# Patient Record
Sex: FEMALE | Race: WHITE | NOT HISPANIC OR LATINO | Employment: OTHER | RURAL
[De-identification: names, ages, dates, MRNs, and addresses within clinical notes are randomized per-mention and may not be internally consistent; named-entity substitution may affect disease eponyms.]

---

## 2022-05-16 DIAGNOSIS — M25.562 LEFT KNEE PAIN, UNSPECIFIED CHRONICITY: Primary | ICD-10-CM

## 2022-05-18 ENCOUNTER — OFFICE VISIT (OUTPATIENT)
Dept: ORTHOPEDICS | Facility: CLINIC | Age: 72
End: 2022-05-18
Payer: COMMERCIAL

## 2022-05-18 ENCOUNTER — HOSPITAL ENCOUNTER (OUTPATIENT)
Dept: RADIOLOGY | Facility: HOSPITAL | Age: 72
Discharge: HOME | End: 2022-05-18
Attending: ORTHOPAEDIC SURGERY
Payer: COMMERCIAL

## 2022-05-18 VITALS — WEIGHT: 220 LBS | BODY MASS INDEX: 40.48 KG/M2 | HEIGHT: 62 IN

## 2022-05-18 DIAGNOSIS — M25.562 LEFT KNEE PAIN, UNSPECIFIED CHRONICITY: ICD-10-CM

## 2022-05-18 DIAGNOSIS — M17.11 OSTEOARTHRITIS OF RIGHT KNEE, UNSPECIFIED OSTEOARTHRITIS TYPE: ICD-10-CM

## 2022-05-18 DIAGNOSIS — M25.561 CHRONIC PAIN OF RIGHT KNEE: Primary | ICD-10-CM

## 2022-05-18 DIAGNOSIS — G89.29 CHRONIC PAIN OF RIGHT KNEE: Primary | ICD-10-CM

## 2022-05-18 PROCEDURE — 1159F PR MEDICATION LIST DOCUMENTED IN MEDICAL RECORD: ICD-10-PCS | Mod: CPTII,,, | Performed by: NURSE PRACTITIONER

## 2022-05-18 PROCEDURE — 73564 X-RAY EXAM KNEE 4 OR MORE: CPT | Mod: 26,RT,,

## 2022-05-18 PROCEDURE — 3008F PR BODY MASS INDEX (BMI) DOCUMENTED: ICD-10-PCS | Mod: CPTII,,, | Performed by: NURSE PRACTITIONER

## 2022-05-18 PROCEDURE — 99213 PR OFFICE/OUTPT VISIT, EST, LEVL III, 20-29 MIN: ICD-10-PCS | Mod: 25,,, | Performed by: NURSE PRACTITIONER

## 2022-05-18 PROCEDURE — 99213 OFFICE O/P EST LOW 20 MIN: CPT | Mod: 25,,, | Performed by: NURSE PRACTITIONER

## 2022-05-18 PROCEDURE — 3008F BODY MASS INDEX DOCD: CPT | Mod: CPTII,,, | Performed by: NURSE PRACTITIONER

## 2022-05-18 PROCEDURE — 20610 DRAIN/INJ JOINT/BURSA W/O US: CPT | Mod: RT,,, | Performed by: NURSE PRACTITIONER

## 2022-05-18 PROCEDURE — 73564 XR KNEE COMP 4 OR MORE VIEWS RIGHT: ICD-10-PCS | Mod: 26,RT,,

## 2022-05-18 PROCEDURE — 73564 X-RAY EXAM KNEE 4 OR MORE: CPT | Mod: TC,RT

## 2022-05-18 PROCEDURE — 20610 LARGE JOINT ASPIRATION/INJECTION: R SUPRA PATELLAR BURSA: ICD-10-PCS | Mod: RT,,, | Performed by: NURSE PRACTITIONER

## 2022-05-18 PROCEDURE — 1159F MED LIST DOCD IN RCRD: CPT | Mod: CPTII,,, | Performed by: NURSE PRACTITIONER

## 2022-05-18 RX ORDER — TRIAMCINOLONE ACETONIDE 40 MG/ML
40 INJECTION, SUSPENSION INTRA-ARTICULAR; INTRAMUSCULAR
Status: DISCONTINUED | OUTPATIENT
Start: 2022-05-18 | End: 2022-05-18 | Stop reason: HOSPADM

## 2022-05-18 RX ORDER — GLIMEPIRIDE 2 MG/1
4 TABLET ORAL 2 TIMES DAILY
COMMUNITY

## 2022-05-18 RX ORDER — METFORMIN HYDROCHLORIDE 1000 MG/1
500 TABLET ORAL 2 TIMES DAILY
COMMUNITY

## 2022-05-18 RX ORDER — ASPIRIN 81 MG/1
81 TABLET ORAL DAILY
COMMUNITY

## 2022-05-18 RX ORDER — DULOXETIN HYDROCHLORIDE 60 MG/1
60 CAPSULE, DELAYED RELEASE ORAL DAILY
COMMUNITY

## 2022-05-18 RX ORDER — OXYBUTYNIN CHLORIDE 5 MG/1
5 TABLET ORAL 2 TIMES DAILY
COMMUNITY

## 2022-05-18 RX ADMIN — TRIAMCINOLONE ACETONIDE 40 MG: 40 INJECTION, SUSPENSION INTRA-ARTICULAR; INTRAMUSCULAR at 08:05

## 2022-05-18 NOTE — PROGRESS NOTES
ASSESSMENT:      ICD-10-CM ICD-9-CM   1. Chronic pain of right knee  M25.561 719.46    G89.29 338.29   2. Osteoarthritis of right knee, unspecified osteoarthritis type  M17.11 715.96       PLAN:     Findings and treatment options were discussed with the patient regarding the diagnosis.   All questions were answered regarding Jessica Cruz 's painful knee.      Natural history and expected course discussed. Questions answered. Educational materials distributed. Reduction in offending activity. OTC analgesics as needed. Arthrocentesis. See procedure note.   Right knee injection today  RTC 6-8 weeks    There are no Patient Instructions on file for this visit.    IMAGING:   X-Ray Knee Complete 4 Or More Views Right    Result Date: 5/18/2022  EXAMINATION: XR KNEE COMP 4 OR MORE VIEWS RIGHT CLINICAL HISTORY: Pain in left knee FINDINGS: There are moderate osteophytes in all 3 compartments There is severe medial compartment joint space loss No acute fracture or aggressive periosteal reaction seen Vascular calcifications present.     1. Osteoarthritis 2. Peripheral vascular disease Electronically signed by: Faye Dewitt Date:    05/18/2022 Time:    09:01         CC: Knee pain    72 y.o. Female who presents as a new patient to me for evaluation of  right knee pain.  She has a history of a left knee replacement done in [place] 15 years ago. She has done well with that knee. She has been bothered by her right knee for many years but has never had any treatment for her right knee.  Occupation: retired  Pain has been present for several years.  Injury description no injury.   Mechanical symptoms, such as clicking, locking, and popping are present.    Swelling and effusions  are not present.   The patient does  describe symptoms of knee instability, such as the knee buckling and the knee giving way.   Symptoms are worsened with activity.  Better with rest. Treatment thus far has included rest, activity modifications, and  oral medications.    she  has not had formal physical therapy.  she has not had prior injections injections into the knee.   she has not had previous advanced imaging such as MRI.     Here today to discuss diagnosis and treatment options.           REVIEW OF SYSTEMS:   Constitution: Negative. Negative for chills, fever and night sweats.    Hematologic/Lymphatic: Negative for bleeding problem. Does not bruise/bleed easily.   Skin: Negative for dry skin, itching and rash.   Musculoskeletal: Negative for falls. Positive for knee pain and muscle weakness.     All other review of symptoms were reviewed and found to be noncontributory.     PAST MEDICAL HISTORY:   Past Medical History:   Diagnosis Date    Diabetes mellitus, type 2        PAST SURGICAL HISTORY:   Past Surgical History:   Procedure Laterality Date    CEREBRAL ANEURYSM REPAIR       SECTION      deaf Right     since childhood    JOINT REPLACEMENT      LAPAROSCOPIC CHOLECYSTECTOMY      SHOULDER ARTHROSCOPY         FAMILY HISTORY:   Family History   Problem Relation Age of Onset    Cancer Mother     Hypoglycemic Mother     Heart disease Mother     Cancer Father        SOCIAL HISTORY:   Social History     Socioeconomic History    Marital status: Unknown   Tobacco Use    Smoking status: Former Smoker    Smokeless tobacco: Never Used    Tobacco comment: chews nicotine gum   Substance and Sexual Activity    Alcohol use: Yes     Alcohol/week: 14.0 standard drinks     Types: 14 Glasses of wine per week       MEDICATIONS:     Current Outpatient Medications:     aspirin (ECOTRIN) 81 MG EC tablet, Take 81 mg by mouth once daily., Disp: , Rfl:     DULoxetine (CYMBALTA) 60 MG capsule, Take 60 mg by mouth once daily., Disp: , Rfl:     glimepiride (AMARYL) 2 MG tablet, Take 4 mg by mouth 2 (two) times a day., Disp: , Rfl:     metFORMIN (GLUCOPHAGE) 1000 MG tablet, Take 500 mg by mouth 2 (two) times a day., Disp: , Rfl:     oxybutynin (DITROPAN) 5  "MG Tab, Take 5 mg by mouth 3 (three) times daily., Disp: , Rfl:     ALLERGIES:   Review of patient's allergies indicates:   Allergen Reactions    Latex, natural rubber Itching        PHYSICAL EXAMINATION:  Ht 5' 2" (1.575 m)   Wt 99.8 kg (220 lb)   BMI 40.24 kg/m²   General    Constitutional: She is oriented to person, place, and time. She appears well-nourished.   HENT:   Head: Normocephalic and atraumatic.   Eyes: Pupils are equal, round, and reactive to light.   Neck: Neck supple.   Cardiovascular: Normal rate and regular rhythm.    Pulmonary/Chest: Effort normal. No respiratory distress.   Abdominal: There is no abdominal tenderness. There is no guarding.   Neurological: She is alert and oriented to person, place, and time. She has normal reflexes.   Psychiatric: She has a normal mood and affect. Her behavior is normal. Judgment and thought content normal.           Right Knee Exam     Inspection   Swelling: present  Effusion: present    Tenderness   The patient is tender to palpation of the medial joint line and lateral joint line.    Crepitus   The patient has crepitus of the patella.    Range of Motion   Extension: normal   Flexion: abnormal     Tests   Meniscus   Rufina:  Medial - positive   Ligament Examination Lachman: normal (-1 to 2mm) PCL-Posterior Drawer: normal (0 to 2mm)     Patella   Patellar Tracking: normal  Patellar Grind: positive    Other   Sensation: normal    Muscle Strength   Right Lower Extremity   Quadriceps:  5/5   Hamstrin/5     Reflexes     Right Side   Achilles:  2+  Quadriceps:  2+    Vascular Exam     Right Pulses  Dorsalis Pedis:      2+  Posterior Tibial:      2+              No orders of the defined types were placed in this encounter.      Large Joint Aspiration/Injection: R supra patellar bursa    Date/Time: 2022 8:30 AM  Performed by: CAREN Londono  Authorized by: CAREN Londono     Consent Done?:  Yes (Verbal)  Indications:  Pain  Site marked: the " procedure site was marked    Local anesthetic:  Bupivacaine 0.25% without epinephrine    Details:  Needle Size:  22 G  Location:  Knee  Site:  R supra patellar bursa  Medications:  40 mg triamcinolone acetonide 40 mg/mL  Patient tolerance:  Patient tolerated the procedure well with no immediate complications

## 2022-08-17 ENCOUNTER — OFFICE VISIT (OUTPATIENT)
Dept: ORTHOPEDICS | Facility: CLINIC | Age: 72
End: 2022-08-17
Payer: COMMERCIAL

## 2022-08-17 DIAGNOSIS — M17.11 OSTEOARTHRITIS OF RIGHT KNEE, UNSPECIFIED OSTEOARTHRITIS TYPE: Primary | ICD-10-CM

## 2022-08-17 PROCEDURE — 20610 DRAIN/INJ JOINT/BURSA W/O US: CPT | Mod: RT,,, | Performed by: NURSE PRACTITIONER

## 2022-08-17 PROCEDURE — 20610 LARGE JOINT ASPIRATION/INJECTION: R SUPRA PATELLAR BURSA: ICD-10-PCS | Mod: RT,,, | Performed by: NURSE PRACTITIONER

## 2022-08-17 RX ORDER — TRIAMCINOLONE ACETONIDE 40 MG/ML
40 INJECTION, SUSPENSION INTRA-ARTICULAR; INTRAMUSCULAR
Status: DISCONTINUED | OUTPATIENT
Start: 2022-08-17 | End: 2022-08-17 | Stop reason: HOSPADM

## 2022-08-17 RX ADMIN — TRIAMCINOLONE ACETONIDE 40 MG: 40 INJECTION, SUSPENSION INTRA-ARTICULAR; INTRAMUSCULAR at 10:08

## 2022-08-17 NOTE — PROGRESS NOTES
CC:  Knee pain    72 y.o. Female returns to clinic for a follow up visit regarding knee pain.       Pt is here for recheck rt knee, states her injection from May gave her good relief, she would like to have another injection on today. She is trying to hold off on total knee surgery for as long as possible.        Past Medical History:   Diagnosis Date    Diabetes mellitus, type 2      Past Surgical History:   Procedure Laterality Date    CEREBRAL ANEURYSM REPAIR       SECTION      deaf Right     since childhood    JOINT REPLACEMENT      LAPAROSCOPIC CHOLECYSTECTOMY      SHOULDER ARTHROSCOPY           REVIEW OF SYSTEMS:   Constitution: Negative. Negative for chills, fever and night sweats.    Hematologic/Lymphatic: Negative for bleeding problem. Does not bruise/bleed easily.   Skin: Negative for dry skin, itching and rash.   Musculoskeletal: Negative for falls. Positive for knee pain and muscle weakness.   All other review of symptoms were reviewed and found to be noncontributory.     PHYSICAL EXAMINATION:  There were no vitals taken for this visit.  General    Constitutional: She is oriented to person, place, and time. She appears well-nourished.   HENT:   Head: Normocephalic and atraumatic.   Eyes: Pupils are equal, round, and reactive to light.   Neck: Neck supple.   Cardiovascular: Normal rate and regular rhythm.    Pulmonary/Chest: Effort normal. No respiratory distress.   Abdominal: There is no abdominal tenderness. There is no guarding.   Neurological: She is alert and oriented to person, place, and time. She has normal reflexes.   Psychiatric: She has a normal mood and affect. Her behavior is normal. Judgment and thought content normal.           Right Knee Exam     Inspection   Swelling: present  Effusion: present    Tenderness   The patient is tender to palpation of the medial joint line and lateral joint line.    Crepitus   The patient has crepitus of the patella.    Range of Motion    Extension: normal   Flexion: abnormal     Tests   Meniscus   Rufina:  Medial - positive   Ligament Examination Lachman: normal (-1 to 2mm) PCL-Posterior Drawer: normal (0 to 2mm)     Patella   Patellar Tracking: normal  Patellar Grind: positive    Other   Sensation: normal    Muscle Strength   Right Lower Extremity   Quadriceps:  5/5   Hamstrin/5     Reflexes     Right Side   Achilles:  2+  Quadriceps:  2+    Vascular Exam     Right Pulses  Dorsalis Pedis:      2+  Posterior Tibial:      2+              IMAGING:  No results found.     ASSESSMENT:      ICD-10-CM ICD-9-CM   1. Osteoarthritis of right knee, unspecified osteoarthritis type  M17.11 715.96       PLAN:     -Findings and treatment options were discussed with the patient  -All questions answered  Natural history and expected course discussed. Questions answered.  Educational materials distributed.  Reduction in offending activity.  OTC analgesics as needed.  Arthrocentesis. See procedure note.  Right knee injection today  RTC 3-4 months    There are no Patient Instructions on file for this visit.      Orders Placed This Encounter   Procedures    Large Joint Aspiration/Injection: R supra patellar bursa         Large Joint Aspiration/Injection: R supra patellar bursa    Date/Time: 2022 10:30 AM  Performed by: CAREN Londono  Authorized by: CAREN Londono     Consent Done?:  Yes (Verbal)  Indications:  Pain  Site marked: the procedure site was marked    Local anesthetic:  Bupivacaine 0.25% without epinephrine    Details:  Needle Size:  22 G  Location:  Knee  Site:  R supra patellar bursa  Medications:  40 mg triamcinolone acetonide 40 mg/mL  Patient tolerance:  Patient tolerated the procedure well with no immediate complications

## 2022-11-16 ENCOUNTER — OFFICE VISIT (OUTPATIENT)
Dept: ORTHOPEDICS | Facility: CLINIC | Age: 72
End: 2022-11-16
Payer: COMMERCIAL

## 2022-11-16 DIAGNOSIS — M17.11 OSTEOARTHRITIS OF RIGHT KNEE, UNSPECIFIED OSTEOARTHRITIS TYPE: Primary | ICD-10-CM

## 2022-11-16 PROCEDURE — 1159F PR MEDICATION LIST DOCUMENTED IN MEDICAL RECORD: ICD-10-PCS | Mod: CPTII,,, | Performed by: NURSE PRACTITIONER

## 2022-11-16 PROCEDURE — 1159F MED LIST DOCD IN RCRD: CPT | Mod: CPTII,,, | Performed by: NURSE PRACTITIONER

## 2022-11-16 PROCEDURE — 20610 LARGE JOINT ASPIRATION/INJECTION: R SUPRA PATELLAR BURSA: ICD-10-PCS | Mod: RT,,, | Performed by: NURSE PRACTITIONER

## 2022-11-16 PROCEDURE — 20610 DRAIN/INJ JOINT/BURSA W/O US: CPT | Mod: RT,,, | Performed by: NURSE PRACTITIONER

## 2022-11-16 RX ORDER — TRIAMCINOLONE ACETONIDE 40 MG/ML
40 INJECTION, SUSPENSION INTRA-ARTICULAR; INTRAMUSCULAR
Status: DISCONTINUED | OUTPATIENT
Start: 2022-11-16 | End: 2022-11-16 | Stop reason: HOSPADM

## 2022-11-16 RX ADMIN — TRIAMCINOLONE ACETONIDE 40 MG: 40 INJECTION, SUSPENSION INTRA-ARTICULAR; INTRAMUSCULAR at 11:11

## 2022-11-16 NOTE — PROGRESS NOTES
CC:  Knee pain    72 y.o. Female returns to clinic for a follow up visit regarding knee pain.       Patient is having knee pain again. She reports in the first shots lasted 3 months but the last one lasted only about 2 months.   She would like to get another injection today. She would also like to discuss total knee arthroplasty on next visit.        Past Medical History:   Diagnosis Date    Diabetes mellitus, type 2      Past Surgical History:   Procedure Laterality Date    CEREBRAL ANEURYSM REPAIR       SECTION      deaf Right     since childhood    JOINT REPLACEMENT      LAPAROSCOPIC CHOLECYSTECTOMY      SHOULDER ARTHROSCOPY           REVIEW OF SYSTEMS:   Constitution: Negative. Negative for chills, fever and night sweats.    Hematologic/Lymphatic: Negative for bleeding problem. Does not bruise/bleed easily.   Skin: Negative for dry skin, itching and rash.   Musculoskeletal: Negative for falls. Positive for knee pain and muscle weakness.   All other review of symptoms were reviewed and found to be noncontributory.     PHYSICAL EXAMINATION:  There were no vitals taken for this visit.  General    Constitutional: She is oriented to person, place, and time. She appears well-nourished.   HENT:   Head: Normocephalic and atraumatic.   Eyes: Pupils are equal, round, and reactive to light.   Neck: Neck supple.   Cardiovascular:  Normal rate and regular rhythm.            Pulmonary/Chest: Effort normal. No respiratory distress.   Abdominal: There is no abdominal tenderness. There is no guarding.   Neurological: She is alert and oriented to person, place, and time. She has normal reflexes.   Psychiatric: She has a normal mood and affect. Her behavior is normal. Judgment and thought content normal.           Right Knee Exam     Inspection   Swelling: present  Effusion: present    Tenderness   The patient is tender to palpation of the medial joint line and lateral joint line.    Crepitus   The patient has  crepitus of the patella.    Range of Motion   Extension:  normal   Flexion:  abnormal     Tests   Meniscus   Rufina:  Medial - positive   Ligament Examination   Lachman: normal (-1 to 2mm)   PCL-Posterior Drawer: normal (0 to 2mm)     Patella   Patellar Tracking: normal  Patellar Grind: positive    Other   Sensation: normal    Muscle Strength   Right Lower Extremity   Quadriceps:  5/5   Hamstrin/5     Reflexes     Right Side   Achilles:  2+  Quadriceps:  2+    Vascular Exam     Right Pulses  Dorsalis Pedis:      2+  Posterior Tibial:      2+        IMAGING:  No results found.     ASSESSMENT:      ICD-10-CM ICD-9-CM   1. Osteoarthritis of right knee, unspecified osteoarthritis type  M17.11 715.96       PLAN:     -Findings and treatment options were discussed with the patient  -All questions answered  Natural history and expected course discussed. Questions answered.  Educational materials distributed.  Reduction in offending activity.  OTC analgesics as needed.  Arthrocentesis. See procedure note.  Right knee steroid injection today.  Return to clinic in 3 months to discuss surgery options with Dr. Godoy.    There are no Patient Instructions on file for this visit.      No orders of the defined types were placed in this encounter.        Large Joint Aspiration/Injection: R supra patellar bursa    Date/Time: 2022 11:00 AM  Performed by: CAREN Londono  Authorized by: CAREN Londono     Consent Done?:  Yes (Verbal)  Indications:  Pain  Site marked: the procedure site was marked    Local anesthetic:  Bupivacaine 0.25% without epinephrine    Details:  Needle Size:  22 G  Location:  Knee  Site:  R supra patellar bursa  Medications:  40 mg triamcinolone acetonide 40 mg/mL  Patient tolerance:  Patient tolerated the procedure well with no immediate complications

## 2023-07-28 DIAGNOSIS — M17.11 OSTEOARTHRITIS OF RIGHT KNEE, UNSPECIFIED OSTEOARTHRITIS TYPE: Primary | ICD-10-CM

## 2023-08-03 ENCOUNTER — HOSPITAL ENCOUNTER (OUTPATIENT)
Dept: RADIOLOGY | Facility: HOSPITAL | Age: 73
Discharge: HOME | End: 2023-08-03
Attending: ORTHOPAEDIC SURGERY
Payer: MEDICARE

## 2023-08-03 ENCOUNTER — OFFICE VISIT (OUTPATIENT)
Dept: ORTHOPEDICS | Facility: CLINIC | Age: 73
End: 2023-08-03
Payer: MEDICARE

## 2023-08-03 DIAGNOSIS — M17.11 OSTEOARTHRITIS OF RIGHT KNEE, UNSPECIFIED OSTEOARTHRITIS TYPE: Primary | ICD-10-CM

## 2023-08-03 DIAGNOSIS — Z01.818 PRE-PROCEDURAL EXAMINATION: ICD-10-CM

## 2023-08-03 DIAGNOSIS — R79.1 ABNORMAL PARTIAL THROMBOPLASTIN TIME (PTT): ICD-10-CM

## 2023-08-03 DIAGNOSIS — M17.11 OSTEOARTHRITIS OF RIGHT KNEE, UNSPECIFIED OSTEOARTHRITIS TYPE: ICD-10-CM

## 2023-08-03 PROCEDURE — 99214 OFFICE O/P EST MOD 30 MIN: CPT | Mod: PBBFAC | Performed by: ORTHOPAEDIC SURGERY

## 2023-08-03 PROCEDURE — 1159F PR MEDICATION LIST DOCUMENTED IN MEDICAL RECORD: ICD-10-PCS | Mod: CPTII,,, | Performed by: ORTHOPAEDIC SURGERY

## 2023-08-03 PROCEDURE — 1159F MED LIST DOCD IN RCRD: CPT | Mod: CPTII,,, | Performed by: ORTHOPAEDIC SURGERY

## 2023-08-03 PROCEDURE — 73564 X-RAY EXAM KNEE 4 OR MORE: CPT | Mod: 26,RT,, | Performed by: ORTHOPAEDIC SURGERY

## 2023-08-03 PROCEDURE — 73564 XR KNEE COMP 4 OR MORE VIEWS RIGHT: ICD-10-PCS | Mod: 26,RT,, | Performed by: ORTHOPAEDIC SURGERY

## 2023-08-03 PROCEDURE — 73564 X-RAY EXAM KNEE 4 OR MORE: CPT | Mod: TC,RT

## 2023-08-03 PROCEDURE — 99214 OFFICE O/P EST MOD 30 MIN: CPT | Mod: S$PBB,,, | Performed by: ORTHOPAEDIC SURGERY

## 2023-08-03 PROCEDURE — 99214 PR OFFICE/OUTPT VISIT, EST, LEVL IV, 30-39 MIN: ICD-10-PCS | Mod: S$PBB,,, | Performed by: ORTHOPAEDIC SURGERY

## 2023-08-03 RX ORDER — MUPIROCIN 20 MG/G
OINTMENT TOPICAL
Status: CANCELLED | OUTPATIENT
Start: 2023-08-03

## 2023-08-03 RX ORDER — SODIUM CHLORIDE 9 MG/ML
INJECTION, SOLUTION INTRAVENOUS CONTINUOUS
Status: CANCELLED | OUTPATIENT
Start: 2023-08-03

## 2023-08-03 NOTE — H&P (VIEW-ONLY)
CC:  Knee pain    73 y.o. Female returns to clinic for a follow up visit regarding right knee pain.       Patient has had injections in the past with the last being in 2022.   She since has had blood clots in that leg so she had not received any additional treatment for her knee pain.  This has been treated with appropriate anticoagulation for over 6 months  She now has that issue resolved and has been off blood thinners since 2023.     She is here to discuss total knee arthroplasty at this time.          Past Medical History:   Diagnosis Date    Diabetes mellitus, type 2      Past Surgical History:   Procedure Laterality Date    CEREBRAL ANEURYSM REPAIR       SECTION      deaf Right     since childhood    JOINT REPLACEMENT      LAPAROSCOPIC CHOLECYSTECTOMY      SHOULDER ARTHROSCOPY           PHYSICAL EXAMINATION:  There were no vitals taken for this visit.  General    Nursing note and vitals reviewed.  Constitutional: She is oriented to person, place, and time. She appears well-developed and well-nourished.   HENT:   Head: Normocephalic and atraumatic.   Nose: Nose normal.   Eyes: Pupils are equal, round, and reactive to light.   Neck: Neck supple.   Cardiovascular:  Normal rate, regular rhythm and intact distal pulses.            Pulmonary/Chest: Effort normal. No respiratory distress. She exhibits no tenderness.   Abdominal: Soft. She exhibits no distension. There is no abdominal tenderness.   Neurological: She is alert and oriented to person, place, and time. She has normal reflexes.   Psychiatric: She has a normal mood and affect. Her behavior is normal. Judgment and thought content normal.     General Musculoskeletal Exam   Gait: antalgic       Right Knee Exam     Inspection   Swelling: present  Effusion: present  Deformity: present    Tenderness   The patient is tender to palpation of the medial joint line.    Crepitus   The patient has crepitus of the patella and medial joint  line.    Range of Motion   Extension:  abnormal   Flexion:  abnormal     Tests   Meniscus   Rufina:  Medial - positive   Ligament Examination   Lachman: normal (-1 to 2mm)   PCL-Posterior Drawer: normal (0 to 2mm)     MCL - Valgus: normal (0 to 2mm)  LCL - Varus: normal  Pivot Shift: normal (Equal)  Reverse Pivot Shift: normal (Equal)  Dial Test at 30 degrees: normal (< 5 degrees)  Dial Test at 90 degrees: normal (< 5 degrees)  Posterior Sag Test: negative  Posterolateral Corner: unstable (>15 degrees difference)  Patella   Patellar Tracking: normal  Q-Angle at 90 degrees: normal  Patellar Grind: positive    Other   Sensation: normal    Muscle Strength   Right Lower Extremity   Quadriceps:  5/5   Hamstrin/5     Reflexes     Right Side   Quadriceps:  2+    Vascular Exam     Right Pulses  Dorsalis Pedis:      2+  Posterior Tibial:      2+            IMAGING:  X-Ray Knee Complete 4 Or More Views Right    Result Date: 8/3/2023  See Procedure Notes for results. IMPRESSION: Please see Ortho procedure notes for report.  This procedure was auto-finalized by: Virtual Radiologist     Four views right knee were obtained today demonstrate the presence of severe degenerative changes primarily affecting the medial compartment of the right knee as well as severe changes affecting the patellofemoral compartment.  Bone-on-bone degenerative changes were seen    ASSESSMENT:      ICD-10-CM ICD-9-CM   1. Osteoarthritis of right knee, unspecified osteoarthritis type  M17.11 715.96       PLAN:     -Findings and treatment options were discussed with the patient  -All questions answered  Natural history and expected course discussed. Questions answered.  Educational materials distributed.  Rest, ice, compression, and elevation (RICE) therapy.  The conservative options including NSAIDs, activity modification, physical therapy, corticosteroid injection, and viscosupplimentation were discussed. She is interested in surgical intervention  at this time.    The surgical process of knee replacement was discussed in detail with the patient including a detailed discussion of the procedure itself (including visual model, x-ray review, and literature review). The typical perioperative and post-operative course was discussed and perioperative risks were discussed to the patient's satisfaction.  Risks and complications discussed included but were not limited to the risks of anesthetic complications, infection, bleeding, wound healing complications, aseptic loosening, instability, limb length inequality, neurologic dysfunction including numbness,  DVT, pulmonary embolism, perioperative medical risks (cardiac, pulmonary, renal, neurologic), and death and the patient elects to proceed. We will initiate pre-operative medical evaluation and clearance and set a provisional date for surgical intervention according to the patient's schedule.   I have discussed anticoagulation with aspirin and coumadin and in low risk patients I have recommended aspirin twice a day.  25 minutes was spent in direct consultation with the patient counselling her on the items listed above.    Plan for rt tka    There are no Patient Instructions on file for this visit.      No orders of the defined types were placed in this encounter.        Procedures

## 2023-08-07 ENCOUNTER — TELEPHONE (OUTPATIENT)
Dept: ORTHOPEDICS | Facility: CLINIC | Age: 73
End: 2023-08-07
Payer: MEDICARE

## 2023-08-07 RX ORDER — SULFAMETHOXAZOLE AND TRIMETHOPRIM 800; 160 MG/1; MG/1
1 TABLET ORAL 2 TIMES DAILY
Qty: 10 TABLET | Refills: 0 | Status: SHIPPED | OUTPATIENT
Start: 2023-08-07 | End: 2023-08-12

## 2023-08-07 NOTE — TELEPHONE ENCOUNTER
PATIENT NOTIFIED     ----- Message from Walt Duke MD sent at 8/7/2023  7:34 AM CDT -----  Sent bactrim to [facility].  Please let her know she has a mild UTI

## 2023-08-14 ENCOUNTER — HOSPITAL ENCOUNTER (OUTPATIENT)
Facility: HOSPITAL | Age: 73
Discharge: NURSING, REHAB OR LONG-TERM CARE | End: 2023-08-16
Attending: ORTHOPAEDIC SURGERY | Admitting: ORTHOPAEDIC SURGERY
Payer: MEDICARE

## 2023-08-14 ENCOUNTER — ANESTHESIA EVENT (OUTPATIENT)
Dept: SURGERY | Facility: HOSPITAL | Age: 73
End: 2023-08-14
Payer: MEDICARE

## 2023-08-14 ENCOUNTER — ANESTHESIA (OUTPATIENT)
Dept: SURGERY | Facility: HOSPITAL | Age: 73
End: 2023-08-14
Payer: MEDICARE

## 2023-08-14 DIAGNOSIS — I10 HTN (HYPERTENSION): ICD-10-CM

## 2023-08-14 DIAGNOSIS — M17.11 OSTEOARTHRITIS OF RIGHT KNEE, UNSPECIFIED OSTEOARTHRITIS TYPE: Primary | ICD-10-CM

## 2023-08-14 LAB
BILIRUB UR QL STRIP: NEGATIVE
CLARITY UR: CLEAR
COLOR UR: ABNORMAL
GLUCOSE SERPL-MCNC: 128 MG/DL (ref 70–105)
GLUCOSE SERPL-MCNC: 141 MG/DL (ref 70–105)
GLUCOSE SERPL-MCNC: 87 MG/DL (ref 70–105)
GLUCOSE UR STRIP-MCNC: NORMAL MG/DL
KETONES UR STRIP-SCNC: NEGATIVE MG/DL
LEUKOCYTE ESTERASE UR QL STRIP: ABNORMAL
MUCOUS, UA: ABNORMAL /LPF
NITRITE UR QL STRIP: NEGATIVE
PH UR STRIP: 6 PH UNITS
PROT UR QL STRIP: NEGATIVE
RBC # UR STRIP: NEGATIVE /UL
RBC #/AREA URNS HPF: 2 /HPF
SP GR UR STRIP: 1.02
UROBILINOGEN UR STRIP-ACNC: NORMAL MG/DL
WBC #/AREA URNS HPF: 5 /HPF

## 2023-08-14 PROCEDURE — C1776 JOINT DEVICE (IMPLANTABLE): HCPCS | Performed by: ORTHOPAEDIC SURGERY

## 2023-08-14 PROCEDURE — 25000003 PHARM REV CODE 250: Performed by: ORTHOPAEDIC SURGERY

## 2023-08-14 PROCEDURE — 94761 N-INVAS EAR/PLS OXIMETRY MLT: CPT

## 2023-08-14 PROCEDURE — 27201423 OPTIME MED/SURG SUP & DEVICES STERILE SUPPLY: Performed by: ORTHOPAEDIC SURGERY

## 2023-08-14 PROCEDURE — 81001 URINALYSIS AUTO W/SCOPE: CPT | Performed by: ORTHOPAEDIC SURGERY

## 2023-08-14 PROCEDURE — 0055T PR COMPUTER-ASSIST MUSCSKEL NAVIG, ORTHO PROC, CT/MRI: ICD-10-PCS | Mod: ,,, | Performed by: ORTHOPAEDIC SURGERY

## 2023-08-14 PROCEDURE — D9220A PRA ANESTHESIA: Mod: ANES,,, | Performed by: ANESTHESIOLOGY

## 2023-08-14 PROCEDURE — 63600175 PHARM REV CODE 636 W HCPCS: Performed by: ORTHOPAEDIC SURGERY

## 2023-08-14 PROCEDURE — 93010 EKG 12-LEAD: ICD-10-PCS | Mod: ,,, | Performed by: STUDENT IN AN ORGANIZED HEALTH CARE EDUCATION/TRAINING PROGRAM

## 2023-08-14 PROCEDURE — 64447 PERIPHERAL BLOCK: ICD-10-PCS | Mod: 59,RT,, | Performed by: ANESTHESIOLOGY

## 2023-08-14 PROCEDURE — 27447 PR TOTAL KNEE ARTHROPLASTY: ICD-10-PCS | Mod: RT,,, | Performed by: ORTHOPAEDIC SURGERY

## 2023-08-14 PROCEDURE — 63600175 PHARM REV CODE 636 W HCPCS: Performed by: ANESTHESIOLOGY

## 2023-08-14 PROCEDURE — 97161 PT EVAL LOW COMPLEX 20 MIN: CPT

## 2023-08-14 PROCEDURE — 36000713 HC OR TIME LEV V EA ADD 15 MIN: Performed by: ORTHOPAEDIC SURGERY

## 2023-08-14 PROCEDURE — 82962 GLUCOSE BLOOD TEST: CPT | Mod: 91

## 2023-08-14 PROCEDURE — 0055T BONE SRGRY CMPTR CT/MRI IMAG: CPT | Mod: ,,, | Performed by: ORTHOPAEDIC SURGERY

## 2023-08-14 PROCEDURE — 93010 ELECTROCARDIOGRAM REPORT: CPT | Mod: ,,, | Performed by: STUDENT IN AN ORGANIZED HEALTH CARE EDUCATION/TRAINING PROGRAM

## 2023-08-14 PROCEDURE — 27000716 HC OXISENSOR PROBE, ANY SIZE: Performed by: NURSE ANESTHETIST, CERTIFIED REGISTERED

## 2023-08-14 PROCEDURE — D9220A PRA ANESTHESIA: Mod: CRNA,,, | Performed by: NURSE ANESTHETIST, CERTIFIED REGISTERED

## 2023-08-14 PROCEDURE — D9220A PRA ANESTHESIA: ICD-10-PCS | Mod: ANES,,, | Performed by: ANESTHESIOLOGY

## 2023-08-14 PROCEDURE — 27000510 HC BLANKET BAIR HUGGER ANY SIZE: Performed by: NURSE ANESTHETIST, CERTIFIED REGISTERED

## 2023-08-14 PROCEDURE — 37000008 HC ANESTHESIA 1ST 15 MINUTES: Performed by: ORTHOPAEDIC SURGERY

## 2023-08-14 PROCEDURE — 93005 ELECTROCARDIOGRAM TRACING: CPT | Mod: 59

## 2023-08-14 PROCEDURE — 97165 OT EVAL LOW COMPLEX 30 MIN: CPT

## 2023-08-14 PROCEDURE — 27447 TOTAL KNEE ARTHROPLASTY: CPT | Mod: RT,,, | Performed by: ORTHOPAEDIC SURGERY

## 2023-08-14 PROCEDURE — 27000177 HC AIRWAY, LARYNGEAL MASK: Performed by: NURSE ANESTHETIST, CERTIFIED REGISTERED

## 2023-08-14 PROCEDURE — 63600175 PHARM REV CODE 636 W HCPCS: Performed by: NURSE ANESTHETIST, CERTIFIED REGISTERED

## 2023-08-14 PROCEDURE — 71000039 HC RECOVERY, EACH ADD'L HOUR: Performed by: ORTHOPAEDIC SURGERY

## 2023-08-14 PROCEDURE — 71000033 HC RECOVERY, INTIAL HOUR: Performed by: ORTHOPAEDIC SURGERY

## 2023-08-14 PROCEDURE — D9220A PRA ANESTHESIA: ICD-10-PCS | Mod: CRNA,,, | Performed by: NURSE ANESTHETIST, CERTIFIED REGISTERED

## 2023-08-14 PROCEDURE — C1713 ANCHOR/SCREW BN/BN,TIS/BN: HCPCS | Performed by: ORTHOPAEDIC SURGERY

## 2023-08-14 PROCEDURE — 37000009 HC ANESTHESIA EA ADD 15 MINS: Performed by: ORTHOPAEDIC SURGERY

## 2023-08-14 PROCEDURE — 64447 NJX AA&/STRD FEMORAL NRV IMG: CPT | Mod: 59,RT,, | Performed by: ANESTHESIOLOGY

## 2023-08-14 PROCEDURE — 36000712 HC OR TIME LEV V 1ST 15 MIN: Performed by: ORTHOPAEDIC SURGERY

## 2023-08-14 PROCEDURE — 25000003 PHARM REV CODE 250: Performed by: NURSE ANESTHETIST, CERTIFIED REGISTERED

## 2023-08-14 DEVICE — ATTUNE PATELLA MEDIALIZED DOME 35MM CEMENTED AOX
Type: IMPLANTABLE DEVICE | Site: KNEE | Status: FUNCTIONAL
Brand: ATTUNE

## 2023-08-14 DEVICE — ATTUNE KNEE SYSTEM TIBIAL BASE ROTATING PLATFORM SIZE 4 CEMENTED
Type: IMPLANTABLE DEVICE | Site: KNEE | Status: FUNCTIONAL
Brand: ATTUNE

## 2023-08-14 DEVICE — CEMENT BONE SPEED SET: Type: IMPLANTABLE DEVICE | Site: KNEE | Status: FUNCTIONAL

## 2023-08-14 DEVICE — ATTUNE KNEE SYSTEM FEMORAL CRUCIATE RETAINING NARROW SIZE 6N RIGHT CEMENTED
Type: IMPLANTABLE DEVICE | Site: KNEE | Status: FUNCTIONAL
Brand: ATTUNE

## 2023-08-14 RX ORDER — FENTANYL CITRATE 50 UG/ML
INJECTION, SOLUTION INTRAMUSCULAR; INTRAVENOUS
Status: DISCONTINUED | OUTPATIENT
Start: 2023-08-14 | End: 2023-08-14

## 2023-08-14 RX ORDER — PROPOFOL 10 MG/ML
VIAL (ML) INTRAVENOUS
Status: DISCONTINUED | OUTPATIENT
Start: 2023-08-14 | End: 2023-08-14

## 2023-08-14 RX ORDER — ACETAMINOPHEN 500 MG
1000 TABLET ORAL EVERY 8 HOURS
Status: COMPLETED | OUTPATIENT
Start: 2023-08-14 | End: 2023-08-15

## 2023-08-14 RX ORDER — ZOLPIDEM TARTRATE 5 MG/1
5 TABLET ORAL NIGHTLY PRN
Status: DISCONTINUED | OUTPATIENT
Start: 2023-08-14 | End: 2023-08-16

## 2023-08-14 RX ORDER — FAMOTIDINE 20 MG/1
20 TABLET, FILM COATED ORAL 2 TIMES DAILY
Status: DISCONTINUED | OUTPATIENT
Start: 2023-08-14 | End: 2023-08-16

## 2023-08-14 RX ORDER — MIDAZOLAM HYDROCHLORIDE 1 MG/ML
INJECTION INTRAMUSCULAR; INTRAVENOUS
Status: DISCONTINUED | OUTPATIENT
Start: 2023-08-14 | End: 2023-08-14

## 2023-08-14 RX ORDER — DOCUSATE SODIUM 100 MG/1
100 CAPSULE, LIQUID FILLED ORAL EVERY 12 HOURS
Status: DISCONTINUED | OUTPATIENT
Start: 2023-08-14 | End: 2023-08-16

## 2023-08-14 RX ORDER — ONDANSETRON 2 MG/ML
INJECTION INTRAMUSCULAR; INTRAVENOUS
Status: DISCONTINUED | OUTPATIENT
Start: 2023-08-14 | End: 2023-08-14

## 2023-08-14 RX ORDER — BUPIVACAINE HYDROCHLORIDE 7.5 MG/ML
INJECTION, SOLUTION EPIDURAL; RETROBULBAR
Status: COMPLETED | OUTPATIENT
Start: 2023-08-14 | End: 2023-08-14

## 2023-08-14 RX ORDER — CEFAZOLIN SODIUM 1 G/3ML
INJECTION, POWDER, FOR SOLUTION INTRAMUSCULAR; INTRAVENOUS
Status: DISCONTINUED | OUTPATIENT
Start: 2023-08-14 | End: 2023-08-14

## 2023-08-14 RX ORDER — METFORMIN HYDROCHLORIDE 500 MG/1
500 TABLET ORAL 2 TIMES DAILY
Status: DISCONTINUED | OUTPATIENT
Start: 2023-08-14 | End: 2023-08-15

## 2023-08-14 RX ORDER — SODIUM CHLORIDE 9 MG/ML
INJECTION, SOLUTION INTRAVENOUS CONTINUOUS
Status: DISCONTINUED | OUTPATIENT
Start: 2023-08-14 | End: 2023-08-14

## 2023-08-14 RX ORDER — SODIUM CHLORIDE 0.9 % (FLUSH) 0.9 %
3 SYRINGE (ML) INJECTION EVERY 6 HOURS PRN
Status: DISCONTINUED | OUTPATIENT
Start: 2023-08-14 | End: 2023-08-16 | Stop reason: HOSPADM

## 2023-08-14 RX ORDER — IPRATROPIUM BROMIDE AND ALBUTEROL SULFATE 2.5; .5 MG/3ML; MG/3ML
3 SOLUTION RESPIRATORY (INHALATION) ONCE
Status: DISCONTINUED | OUTPATIENT
Start: 2023-08-14 | End: 2023-08-14 | Stop reason: HOSPADM

## 2023-08-14 RX ORDER — EPHEDRINE SULFATE 50 MG/ML
INJECTION, SOLUTION INTRAVENOUS
Status: DISCONTINUED | OUTPATIENT
Start: 2023-08-14 | End: 2023-08-14

## 2023-08-14 RX ORDER — DULOXETIN HYDROCHLORIDE 30 MG/1
60 CAPSULE, DELAYED RELEASE ORAL DAILY
Status: DISCONTINUED | OUTPATIENT
Start: 2023-08-15 | End: 2023-08-16 | Stop reason: HOSPADM

## 2023-08-14 RX ORDER — EPINEPHRINE 1 MG/ML
INJECTION, SOLUTION, CONCENTRATE INTRAVENOUS
Status: DISCONTINUED | OUTPATIENT
Start: 2023-08-14 | End: 2023-08-14

## 2023-08-14 RX ORDER — ONDANSETRON 4 MG/1
8 TABLET, ORALLY DISINTEGRATING ORAL EVERY 8 HOURS PRN
Status: DISCONTINUED | OUTPATIENT
Start: 2023-08-14 | End: 2023-08-16

## 2023-08-14 RX ORDER — POLYETHYLENE GLYCOL 3350 17 G/17G
17 POWDER, FOR SOLUTION ORAL DAILY
Status: DISCONTINUED | OUTPATIENT
Start: 2023-08-15 | End: 2023-08-16

## 2023-08-14 RX ORDER — GLYCOPYRROLATE 0.2 MG/ML
INJECTION INTRAMUSCULAR; INTRAVENOUS
Status: DISCONTINUED | OUTPATIENT
Start: 2023-08-14 | End: 2023-08-14

## 2023-08-14 RX ORDER — TRANEXAMIC ACID 100 MG/ML
INJECTION, SOLUTION INTRAVENOUS
Status: DISCONTINUED | OUTPATIENT
Start: 2023-08-14 | End: 2023-08-14

## 2023-08-14 RX ORDER — DIPHENHYDRAMINE HYDROCHLORIDE 50 MG/ML
25 INJECTION INTRAMUSCULAR; INTRAVENOUS EVERY 6 HOURS PRN
Status: DISCONTINUED | OUTPATIENT
Start: 2023-08-14 | End: 2023-08-14 | Stop reason: HOSPADM

## 2023-08-14 RX ORDER — HYDROMORPHONE HYDROCHLORIDE 2 MG/ML
0.5 INJECTION, SOLUTION INTRAMUSCULAR; INTRAVENOUS; SUBCUTANEOUS EVERY 5 MIN PRN
Status: DISCONTINUED | OUTPATIENT
Start: 2023-08-14 | End: 2023-08-14 | Stop reason: HOSPADM

## 2023-08-14 RX ORDER — NAPROXEN SODIUM 220 MG/1
325 TABLET, FILM COATED ORAL DAILY
Status: DISCONTINUED | OUTPATIENT
Start: 2023-08-15 | End: 2023-08-15

## 2023-08-14 RX ORDER — MUPIROCIN 20 MG/G
1 OINTMENT TOPICAL 2 TIMES DAILY
Status: DISCONTINUED | OUTPATIENT
Start: 2023-08-14 | End: 2023-08-16

## 2023-08-14 RX ORDER — PHENYLEPHRINE HYDROCHLORIDE 10 MG/ML
INJECTION INTRAVENOUS
Status: DISCONTINUED | OUTPATIENT
Start: 2023-08-14 | End: 2023-08-14

## 2023-08-14 RX ORDER — MUPIROCIN 20 MG/G
OINTMENT TOPICAL
Status: DISCONTINUED | OUTPATIENT
Start: 2023-08-14 | End: 2023-08-14 | Stop reason: HOSPADM

## 2023-08-14 RX ORDER — CLINDAMYCIN PHOSPHATE 900 MG/50ML
900 INJECTION, SOLUTION INTRAVENOUS
Status: DISCONTINUED | OUTPATIENT
Start: 2023-08-14 | End: 2023-08-14

## 2023-08-14 RX ORDER — BUPIVACAINE HYDROCHLORIDE 5 MG/ML
INJECTION, SOLUTION EPIDURAL; INTRACAUDAL
Status: DISCONTINUED | OUTPATIENT
Start: 2023-08-14 | End: 2023-08-14

## 2023-08-14 RX ORDER — MEPERIDINE HYDROCHLORIDE 25 MG/ML
25 INJECTION INTRAMUSCULAR; INTRAVENOUS; SUBCUTANEOUS EVERY 10 MIN PRN
Status: DISCONTINUED | OUTPATIENT
Start: 2023-08-14 | End: 2023-08-14 | Stop reason: HOSPADM

## 2023-08-14 RX ORDER — MORPHINE SULFATE 10 MG/ML
4 INJECTION INTRAMUSCULAR; INTRAVENOUS; SUBCUTANEOUS EVERY 5 MIN PRN
Status: DISCONTINUED | OUTPATIENT
Start: 2023-08-14 | End: 2023-08-14 | Stop reason: HOSPADM

## 2023-08-14 RX ORDER — ONDANSETRON 2 MG/ML
4 INJECTION INTRAMUSCULAR; INTRAVENOUS DAILY PRN
Status: DISCONTINUED | OUTPATIENT
Start: 2023-08-14 | End: 2023-08-14 | Stop reason: HOSPADM

## 2023-08-14 RX ORDER — OXYCODONE HYDROCHLORIDE 5 MG/1
10 TABLET ORAL EVERY 4 HOURS PRN
Status: DISCONTINUED | OUTPATIENT
Start: 2023-08-14 | End: 2023-08-16

## 2023-08-14 RX ORDER — OXYCODONE HYDROCHLORIDE 5 MG/1
5 TABLET ORAL EVERY 4 HOURS PRN
Status: DISCONTINUED | OUTPATIENT
Start: 2023-08-14 | End: 2023-08-16

## 2023-08-14 RX ORDER — CELECOXIB 100 MG/1
200 CAPSULE ORAL 2 TIMES DAILY
Status: DISCONTINUED | OUTPATIENT
Start: 2023-08-14 | End: 2023-08-16

## 2023-08-14 RX ORDER — GLIMEPIRIDE 2 MG/1
4 TABLET ORAL 2 TIMES DAILY
Status: DISCONTINUED | OUTPATIENT
Start: 2023-08-14 | End: 2023-08-15

## 2023-08-14 RX ORDER — PREGABALIN 75 MG/1
75 CAPSULE ORAL 2 TIMES DAILY
Status: DISCONTINUED | OUTPATIENT
Start: 2023-08-14 | End: 2023-08-16

## 2023-08-14 RX ORDER — ACETAMINOPHEN 10 MG/ML
1000 INJECTION, SOLUTION INTRAVENOUS ONCE
Status: COMPLETED | OUTPATIENT
Start: 2023-08-14 | End: 2023-08-14

## 2023-08-14 RX ORDER — LIDOCAINE HYDROCHLORIDE 20 MG/ML
INJECTION, SOLUTION EPIDURAL; INFILTRATION; INTRACAUDAL; PERINEURAL
Status: DISCONTINUED | OUTPATIENT
Start: 2023-08-14 | End: 2023-08-14

## 2023-08-14 RX ORDER — LOPERAMIDE HYDROCHLORIDE 2 MG/1
4 CAPSULE ORAL ONCE
Status: COMPLETED | OUTPATIENT
Start: 2023-08-14 | End: 2023-08-14

## 2023-08-14 RX ORDER — ROPIVACAINE/EPI/CLONIDINE/KET 2.46-0.005
SYRINGE (ML) INJECTION
Status: DISCONTINUED | OUTPATIENT
Start: 2023-08-14 | End: 2023-08-14 | Stop reason: HOSPADM

## 2023-08-14 RX ORDER — OXYBUTYNIN CHLORIDE 5 MG/1
5 TABLET ORAL 2 TIMES DAILY
Status: DISCONTINUED | OUTPATIENT
Start: 2023-08-14 | End: 2023-08-16 | Stop reason: HOSPADM

## 2023-08-14 RX ORDER — BISACODYL 10 MG
10 SUPPOSITORY, RECTAL RECTAL DAILY PRN
Status: DISCONTINUED | OUTPATIENT
Start: 2023-08-14 | End: 2023-08-16

## 2023-08-14 RX ORDER — ROPIVACAINE HYDROCHLORIDE 5 MG/ML
INJECTION, SOLUTION EPIDURAL; INFILTRATION; PERINEURAL
Status: SHIPPED | OUTPATIENT
Start: 2023-08-14 | End: 2023-08-14

## 2023-08-14 RX ADMIN — BUPIVACAINE HYDROCHLORIDE 1.5 ML: 7.5 INJECTION, SOLUTION EPIDURAL; RETROBULBAR at 01:08

## 2023-08-14 RX ADMIN — ACETAMINOPHEN 1000 MG: 500 TABLET ORAL at 09:08

## 2023-08-14 RX ADMIN — LOPERAMIDE HYDROCHLORIDE 4 MG: 2 CAPSULE ORAL at 06:08

## 2023-08-14 RX ADMIN — PROPOFOL 150 MG: 10 INJECTION, EMULSION INTRAVENOUS at 01:08

## 2023-08-14 RX ADMIN — PREGABALIN 75 MG: 75 CAPSULE ORAL at 09:08

## 2023-08-14 RX ADMIN — GLIMEPIRIDE 4 MG: 2 TABLET ORAL at 09:08

## 2023-08-14 RX ADMIN — PHENYLEPHRINE HYDROCHLORIDE 100 MCG: 10 INJECTION INTRAVENOUS at 01:08

## 2023-08-14 RX ADMIN — ACETAMINOPHEN 1000 MG: 10 INJECTION, SOLUTION INTRAVENOUS at 03:08

## 2023-08-14 RX ADMIN — MUPIROCIN 1 G: 20 OINTMENT TOPICAL at 10:08

## 2023-08-14 RX ADMIN — EPHEDRINE SULFATE 25 MG: 50 INJECTION INTRAVENOUS at 01:08

## 2023-08-14 RX ADMIN — SODIUM CHLORIDE: 9 INJECTION, SOLUTION INTRAVENOUS at 01:08

## 2023-08-14 RX ADMIN — LIDOCAINE HYDROCHLORIDE 100 MG: 20 INJECTION, SOLUTION INTRAVENOUS at 01:08

## 2023-08-14 RX ADMIN — CELECOXIB 200 MG: 100 CAPSULE ORAL at 09:08

## 2023-08-14 RX ADMIN — CEFAZOLIN 2 G: 2 INJECTION, POWDER, FOR SOLUTION INTRAMUSCULAR; INTRAVENOUS at 09:08

## 2023-08-14 RX ADMIN — EPINEPHRINE 0.01 MCG: 1 INJECTION, SOLUTION, CONCENTRATE INTRAVENOUS at 01:08

## 2023-08-14 RX ADMIN — TRANEXAMIC ACID 1000 MG: 100 INJECTION, SOLUTION INTRAVENOUS at 01:08

## 2023-08-14 RX ADMIN — ZOLPIDEM TARTRATE 5 MG: 5 TABLET, COATED ORAL at 09:08

## 2023-08-14 RX ADMIN — ONDANSETRON 8 MG: 2 INJECTION INTRAMUSCULAR; INTRAVENOUS at 01:08

## 2023-08-14 RX ADMIN — ROPIVACAINE HYDROCHLORIDE 30 ML: 5 INJECTION, SOLUTION EPIDURAL; INFILTRATION; PERINEURAL at 12:08

## 2023-08-14 RX ADMIN — FENTANYL CITRATE 100 MCG: 50 INJECTION INTRAMUSCULAR; INTRAVENOUS at 01:08

## 2023-08-14 RX ADMIN — CEFAZOLIN 2 G: 1 INJECTION, POWDER, FOR SOLUTION INTRAMUSCULAR; INTRAVENOUS; PARENTERAL at 01:08

## 2023-08-14 RX ADMIN — VANCOMYCIN HYDROCHLORIDE 1500 MG: 1 INJECTION, POWDER, LYOPHILIZED, FOR SOLUTION INTRAVENOUS at 01:08

## 2023-08-14 RX ADMIN — FAMOTIDINE 20 MG: 20 TABLET, FILM COATED ORAL at 09:08

## 2023-08-14 RX ADMIN — MIDAZOLAM 2 MG: 1 INJECTION INTRAMUSCULAR; INTRAVENOUS at 01:08

## 2023-08-14 RX ADMIN — OXYCODONE HYDROCHLORIDE 5 MG: 5 TABLET ORAL at 06:08

## 2023-08-14 RX ADMIN — OXYBUTYNIN CHLORIDE 5 MG: 5 TABLET ORAL at 09:08

## 2023-08-14 RX ADMIN — OXYCODONE HYDROCHLORIDE 5 MG: 5 TABLET ORAL at 10:08

## 2023-08-14 RX ADMIN — GLYCOPYRROLATE 0.2 MG: 0.2 INJECTION INTRAMUSCULAR; INTRAVENOUS at 01:08

## 2023-08-14 RX ADMIN — METFORMIN HYDROCHLORIDE 500 MG: 500 TABLET ORAL at 09:08

## 2023-08-14 RX ADMIN — DOCUSATE SODIUM 100 MG: 100 CAPSULE, LIQUID FILLED ORAL at 09:08

## 2023-08-14 NOTE — OR NURSING
1507-Pt rec'd to RR awake and alert, stable condition, responds to stimulation, NADN, SaO2-96% on 6L O2 via simple fm, will titrate O2 PRN, resp even et unlabored, IV fluids infusing, dressing to right leg C/D/I, unable to wiggle toes at present, c/o numbness, toes are cool/dry to touch, cap refill < 3 secs, will con't to monitor, denies any c/o pain at present, SCD hose to left leg, safety measures in effect.    1510-Blood sugar check is 87.    1516-Tylenol 1g given IV per MD order.    1520-Pt placed on room air, SaO2-96%, NADN, no c/o pain voiced at present, x-ray done to right knee at bedside.    1545-Pt resting, NADN, no c/o pain at present.    1600- at bedside, posterior tibial pulse noted via doppler, NADN, no c/o pain , will con't to monitor.    1620-Pt awake, alert and talking with staff, no c/o pain, dressing remains C/D/I with immobilizer in place, will con't to monitor.    1650-Pt awake and alert, NADN, denies any c/o pain, orders to transfer to room 569.    1705-Pt care released to Lisandra(RN), pt awake and alert, vss temp 97.5 oral, hr-67, resp-12, SaO2-95% on room air, b/p 116/50.

## 2023-08-14 NOTE — OP NOTE
Department of Orthopedic Surgery    Operative Note  NAME: Jessica Cruz    : 1950      DATE: 2023      PREOPERATIVE DIAGNOSIS: Osteoarthritis of right knee, unspecified osteoarthritis type [M17.11]    POSTOPERATIVE DIAGNOSIS:  Osteoarthritis of right knee, unspecified osteoarthritis type [M17.11]    PROCEDURE: right Total Knee Arthroplasty    SURGEON: Walt Duke    ASSISTANT: Hernan Pollard    ANESTHESIA: Spinal + adductor canal    BLOOD LOSS: 30cc    TOURNIQUET TIME:  52 mins    DRAINS: None    IMPLANTS:   Implant Name Type Inv. Item Serial No.  Lot No. LRB No. Used Action   CEMENT BONE SPEED SET - [id number] Cement CEMENT BONE SPEED SET  [employer] SARA. [id number] Right 1 Implanted   CEMENT BONE SPEED SET - [id number] Cement CEMENT BONE SPEED SET  [employer] SARA. [id number] Right 1 Implanted   SYS KNEE EPAK PIN ATTUNE - [id number] Pin SYS KNEE EPAK PIN ATTUNE  [employer]. [id number] Right 1 Implanted and Explanted   PIN THREADED STERILE - [id number] Pin PIN THREADED STERILE  [employer]. [id number] Right 1 Implanted and Explanted   COMP ATTUNE PKT RP DEDE 4 - [id number]  COMP ATTUNE PKT RP DEDE 4  [employer] [id number] Right 1 Implanted   PATELLA ATTUNE MEDLZD DOME 35 - [id number]  PATELLA ATTUNE MEDLZD DOME 35  [employer]. [id number] Right 1 Implanted   attune femoral cruciate retaining narrow size 6n right cemented     [id number] Right 1 Implanted   TIBIAL INSERT ROTATING PLATFORM CRUCIATE RETAINING    [employer] ([facility]) [id number] Right 1 Implanted       Attune Cemented Tibial Baseplate, Cruciate Retaining, Size 4  Attune Cemented Femoral Component, Size 6  Attune Rotating Platform, Cruciate retaining 5 polyethylene spacer   Attune Size 35 Medialized dome patella button     INDICATIONS FOR PROCEDURE:   [unfilled] is a 73 y.o.-year-old with debilitating right-sided knee pain despite nonoperative measures and interested in knee arthroplasty.    PROCEDURE IN DETAIL:  After  obtaining informed consent and starting the patient on preoperative IV antibiotics, the patient was taken back to the Operating   Room. Anesthesia was performed by Anesthesia Team. The right lower  extremity was prepped and draped in normal sterile fashion.  An Esmarch bandage was used to exsanguinate the affected lower extremity and the pneumatic tourniquet  was inflated to 300 mmHg.  A standard longitudinal midline incision was made beginning 3 fingerbreadths above the superior pole of the patella extending down to the tibial tubercle.  Full-thickness skin flaps were raised.  A medial parapatellar arthrotomy was made. The patella was then everted.    A release of the proximal medial tibia and MCL was then undertaken.  The infrapatella fat pad was resected.  An intramedullary guide was then used to perform the distal femur cut.  With the guide set at the appropriate degree of valgus alignment, the guide was inserted and the appropriate cut was made.   We then used our size 6, 4-in-1 cutting block to make our anterior, posterior, and anterior and posterior chamfer cuts. The tibial cut was addressed next.  Computer navigation was used in this case to verify the correct tibial alignment.  2 guidepins were placed percutaneously in the tibia, and waypoints were inserted into the [employer] software.  An extramedullary tibial guide was also used to verify corrected alignment.  The proximal tibia was then cut. The tibia was then drilled and punched.  Trial components were placed on the femur and tibia.   Flexion and extension gaps were symmetric and balanced at this point.    The trial components demonstrated proper range of motion and stability.    The patella was addressed next.  A guide was utilized to resect 9.5 mm of bone.  A drill guide was applied to flat patella surface and appropriate drill holes were made.  A trial patella button was placed.  The patella tracked nicely.  No lateral release was required.  We then  Pulsavac irrigated cancellous bone and vacuum mixed the cement.      The final components were then implanted.  The tibial component was placed first, followed by the femoral component.  Excess cement was removed and a trial polyethylene was placed.  The cemented patella was then placed and excess cement was removed.   Once the cement hardened,the tourniquet was released.  Bleeders were coagulated.  1 gram of TXA was given at this point. The appropriate dose of pericapsular injection was then administered, focusing on the posterior capsule.  Final polyethylene component was placed.  Satisfactory range of motion and stability was demonstrated.    The extensor retinaculum was then clsoed with #1 Vicryl figure of eight sutures with the knee in slight flexion, the subcutaneous tissue with 2-0 Vicryl, skin reapproximated with staples.  A sterile dressing was applied, as was a knee immobilizer.  Patient was then taken to the recovery room in stable condition.

## 2023-08-14 NOTE — PT/OT/SLP EVAL
Occupational Therapy Evaluation     Name: Jessica Cruz  MRN: [id number]  Admitting Diagnosis: <principal problem not specified> Day of Surgery  Recent Surgery: Procedure(s) (LRB):  ARTHROPLASTY, KNEE, TOTAL, USING COMPUTER-ASSISTED NAVIGATION (Right) Day of Surgery    Recommendations:     Discharge Recommendations: nursing facility, skilled, rehabilitation facility  Level of Assistance Recommended: Intermittent assistance  Discharge Equipment Recommendations: walker, rolling  Barriers to discharge:      Assessment:     Jessica Cruz is a 73 y.o. female with a medical diagnosis of (R) TKR She presents with performance deficits affecting function including weakness, impaired endurance, impaired self care skills, impaired functional mobility, gait instability, decreased ROM, orthopedic precautions. Pt is post op (R) TKR today. Pt's spinal was still active and pt did not have good control and coordination of her legs during eval.    Rehab Prognosis: Good; patient would benefit from acute OT services to address these deficits and reach maximum level of function.    Plan:     Patient to be seen 5 x/week to address the above listed problems via self-care/home management, therapeutic exercises, therapeutic activities  Plan of Care Expires: 08/21/23  Plan of Care Reviewed with: patient, spouse    Subjective     Chief Complaint: Pt is post op (R) TKR today  Patient Comments/Goals: Pt wants to go to swingbed at Discharge  Pain/Comfort:  Pain Rating 1: 0/10  Pain Rating Post-Intervention 1: 0/10    Patients cultural, spiritual, Latter day conflicts given the current situation: yes    Social History:  Living Environment: Patient lives with their spouse in  split level home  with number of outside stair(s): 2 to 3 steps and number of inside stair(s): 2 without a rail. There is a bathroom and bed room on the entrance level so pt does not have to perform steps once inside if it is an issue for her.  Prior Level of Function:  Prior to admission, patient was modified independent with ADLs using rollator for mobility.  Roles and Routines: Patient was retired prior to admission.  Equipment Used at Home: rollator  DME owned (not currently used): none  Assistance Upon Discharge: facility staff    Objective:     Communicated with LEELEE Muniz prior to session. Patient found HOB elevated with peripheral IV, knee immobilizer, bed alarm upon OT entry to room.    General Precautions: Standard, fall   Orthopedic Precautions: RLE weight bearing as tolerated   Braces: Knee immobilizer    Respiratory Status: Room air    Occupational Performance    Gait belt applied - Yes    Bed Mobility:   Supine to sit from left side of bed with minimum assistance  Sit to Supine with moderate assistance on left side of bed    Functional Mobility/Transfers:  Sit <> Stand Transfer with minimum assistance and 2 persons with rolling walker  Functional Mobility: Pt took side steps to HOB with mod(A)x2 as she did not have enough feeling and coordination in her legs to allow her to ambulate due to her spinal block    Activities of Daily Living:  Upper Body Dressing: maximal assistance    Cognitive/Visual Perceptual:  Cognitive/Psychosocial Skills:    -     Oriented to: Person  -     Follows Commands/attention: Follows one-step commands  -     Communication: clear/fluent  -     Safety awareness/insight to disability: intact  -     Mood/Affect/Coping skills/emotional control: Pleasant  Visual/Perceptual:    -     Wears glasses    Physical Exam:  Balance:    -     Sitting: contact guard assistance  -     Standing: minimum assistance and of 2 persons  Dominant hand: Left  Upper Extremity Range of Motion:     -       Right Upper Extremity: WFL  -       Left Upper Extremity: WFL  Upper Extremity Strength:    -       Right Upper Extremity: WFL  -       Left Upper Extremity: WFL   Strength:    -       Right Upper Extremity: WFL  -       Left Upper Extremity: WFL  Fine Motor  Coordination:    -       Intact    AMPAC 6 Click ADL:  AMPAC Total Score: 14    Treatment & Education:  Educated on the importance of mobility to maximize recovery  Educated on safety with functional mobility; hand placement to ensure safe transfers to various surfaces in prep for ADLs  Educated on weight bearing status  Will continue to educate as needed  OT treatment plan      Patient left HOB elevated with all lines intact, call button in reach, RN notified, and spouse present.    GOALS:   Multidisciplinary Problems       Occupational Therapy Goals          Problem: Occupational Therapy    Goal Priority Disciplines Outcome Interventions   Occupational Therapy Goal     OT, PT/OT Ongoing, Progressing    Description: STG: (in 1 week)  1.Pt will perform bathing with setup and min(A)  2.Pt will perform UE dressing with setup  3.Pt will perform LE dressing with setup and min(A)  4.Pt will transfer bed/chair/bsc with CGA with RW  5.Pt will perform standing task x 3 min with CGA with RW  6.Tolerate 15 min of tx without fatigue.      LTG: (in 4 weeks)  Restore to max I with selfcare and mobility.                          History:     Past Medical History:   Diagnosis Date    Diabetes mellitus, type 2          Past Surgical History:   Procedure Laterality Date    CEREBRAL ANEURYSM REPAIR       SECTION      deaf Right     since childhood    JOINT REPLACEMENT      LAPAROSCOPIC CHOLECYSTECTOMY      SHOULDER ARTHROSCOPY         Time Tracking:     OT Date of Treatment: 23  OT Start Time:   OT Stop Time: 1735  OT Total Time (min): 18 min    Billable Minutes: Evaluation low complexity    2023

## 2023-08-14 NOTE — PLAN OF CARE
Problem: Physical Therapy  Goal: Physical Therapy Goal  Description: Short Term Goals to be met by: 2023    Patient will increase functional independence with mobility by performin. Supine to sit with Modified [place]  2. Sit to stand transfer with Modified [place]  3. Bed to chair transfer with Modified [place] using Rolling Walker, WBAT right LE  4. Gait  x 100 feet with Modified [place] using Rolling Walker, WBAT right LE.   5. Lower extremity exercise program x30 reps per handout, with assistance as needed  6. Knee ROM 0-90    Long Term Goals to be met by: 10/14/2023    Pt will regain full independent functional mobility to return to prior activities of daily living.   Outcome: Ongoing, Progressing     Patient demonstrated unsafe mobility due to poor kinesthetic awareness bilateral LE from residual effects of spinal anaesthesia. Patient highly motivated and will benefit from swing bed/rehab to optimize recovery from TKR. PT to follow per protocol.

## 2023-08-14 NOTE — ANESTHESIA PROCEDURE NOTES
Peripheral Block    Patient location during procedure: OR   Block not for primary anesthetic.  Reason for block: at surgeon's request and post-op pain management   Post-op Pain Location: RT TKA, p op pain   Start time: 8/14/2023 12:58 PM  Timeout: 8/14/2023 12:57 PM   End time: 8/14/2023 1:05 PM    Staffing  Authorizing Provider: Danny Montague MD  Performing Provider: Danny Montague MD    Staffing  Performed by: Danny Montague MD  Authorized by: Danny Montague MD    Preanesthetic Checklist  Completed: patient identified, IV checked, site marked, risks and benefits discussed, surgical consent, monitors and equipment checked, pre-op evaluation and timeout performed  Peripheral Block  Patient position: supine  Prep: ChloraPrep  Patient monitoring: heart rate, cardiac monitor, continuous pulse ox, continuous capnometry and frequent blood pressure checks  Block type: adductor canal  Laterality: right  Injection technique: single shot  Needle  Needle type: Stimuplex   Needle gauge: 20 G  Needle length: 4 in  Needle localization: ultrasound guidance   -ultrasound image captured on disc.  Assessment  Injection assessment: negative aspiration, negative parasthesia and local visualized surrounding nerve  Paresthesia pain: none  Heart rate change: no  Slow fractionated injection: yes  Pain Tolerance: comfortable throughout block and no complaints  Medications:    Medications: ROPIvacaine (NAROPIN) injection 0.5% - Perineural   30 mL - 8/14/2023 12:07:00 PM

## 2023-08-14 NOTE — ANESTHESIA PREPROCEDURE EVALUATION
08/14/2023  Jessica Cruz is a 73 y.o., female.      Pre-op Assessment    I have reviewed the Patient Summary Reports.     I have reviewed the Nursing Notes. I have reviewed the NPO Status.   I have reviewed the Medications.     Review of Systems  Anesthesia Hx:  Denies Family Hx of Anesthesia complications.   Denies Personal Hx of Anesthesia complications.   Social:  Non-Smoker, No Alcohol Use    Hematology/Oncology:  Hematology Normal   Oncology Normal     EENT/Dental:EENT/Dental Normal   Cardiovascular:  Cardiovascular Normal  Denies MI.   ECG has been reviewed.    Pulmonary:  Pulmonary Normal    Renal/:  Renal/ Normal     Hepatic/GI:  Hepatic/GI Normal    Musculoskeletal:  Musculoskeletal Normal    Neurological:  Neurology Normal  Denies CVA.    Endocrine:   Diabetes  Obesity / BMI > 30  Dermatological:  Skin Normal    Psych:  Psychiatric Normal           Physical Exam  General: Well nourished, Cooperative, Alert and Oriented    Airway:  Mallampati: II / II  Mouth Opening: Normal  TM Distance: Normal  Neck ROM: Normal ROM    Dental:  Intact    Chest/Lungs:  Clear to auscultation    Heart:  Rate: Normal  Rhythm: Regular Rhythm  Sounds: Normal        Chemistry        Component Value Date/Time     08/03/2023 1102    K 4.2 08/03/2023 1102     08/03/2023 1102    CO2 32 08/03/2023 1102    BUN 24 (H) 08/03/2023 1102    CREATININE 0.76 08/03/2023 1102     (H) 08/03/2023 1102        Component Value Date/Time    CALCIUM 9.7 08/03/2023 1102    ALKPHOS 83 08/03/2023 1102    AST 19 08/03/2023 1102    ALT 25 08/03/2023 1102    BILITOT 0.3 08/03/2023 1102        Lab Results   Component Value Date    WBC 5.71 08/03/2023    HGB 12.5 08/03/2023    HCT 40.0 08/03/2023     08/03/2023     No results found for this or any previous visit.      Anesthesia Plan  Type of Anesthesia, risks & benefits  discussed:    Anesthesia Type: Regional, Gen Supraglottic Airway, Spinal  Intra-op Monitoring Plan: Standard ASA Monitors  Post Op Pain Control Plan: multimodal analgesia and peripheral nerve block  Induction:  IV  Airway Plan: Direct  Informed Consent: Informed consent signed with the Patient and all parties understand the risks and agree with anesthesia plan.  All questions answered.   ASA Score: 3  Day of Surgery Review of History & Physical: H&P Update referred to the surgeon/provider.I have interviewed and examined the patient. I have reviewed the patient's H&P dated: There are no significant changes.     Ready For Surgery From Anesthesia Perspective.     .

## 2023-08-14 NOTE — PT/OT/SLP EVAL
"Physical Therapy Evaluation and Treatment    Patient Name: Jessica Cruz   MRN: [id number]  Recent Surgery: Procedure(s) (LRB):  ARTHROPLASTY, KNEE, TOTAL, USING COMPUTER-ASSISTED NAVIGATION (Right) Day of Surgery    Recommendations:     Discharge Recommendations: nursing facility, skilled, rehabilitation facility   Discharge Equipment Recommendations: walker, rolling   Barriers to discharge: Increased level of assist and Ongoing medical treatment    Assessment:     Jessica Cruz is a 73 y.o. female admitted with a medical diagnosis of right TKR. She presents with the following impairments/functional limitations: weakness, impaired endurance, impaired self care skills, impaired functional mobility, gait instability, impaired balance, decreased coordination, decreased lower extremity function, pain, decreased ROM, orthopedic precautions.     Patient demonstrated unsafe mobility due to poor kinesthetic awareness bilateral LE from residual effects of spinal anaesthesia. Patient highly motivated and will benefit from swing bed/rehab to optimize recovery from TKR. PT to follow per protocol.    Rehab Prognosis: Good; patient would benefit from acute PT services to address these deficits and reach maximum level of function.    Plan:     During this hospitalization, patient to be seen BID (5x/week; daily 2x/week) to address the above listed problems via gait training, therapeutic activities, therapeutic exercises    Plan of Care Expires: 09/14/23    Subjective     Chief Complaint: right TKR  Patient Comments/Goals: "I had my other knee done a long time ago. I gave away all that equipment"  Pain/Comfort:  Pain Rating 1: 0/10 (block in effect)  Location - Side 1: Right  Location 1: knee  Pain Rating Post-Intervention 1: 0/10    Social History:  Living Environment: Patient lives with their spouse in  split level home  with number of outside stair(s): 3/no rail and number of inside stair(s): 2/no rail to master " bedroom/bathroom  Prior Level of Function: Prior to admission, patient was independent  Equipment Used at Home: rollator  DME owned (not currently used): none  Assistance Upon Discharge:  rehab staff vs spouse and home health    Objective:     Communicated with Elvis Malik LPN prior to session. Patient found supine with peripheral IV, knee immobilizer, bed alarm upon PT entry to room.    General Precautions: Standard, fall   Orthopedic Precautions: RLE weight bearing as tolerated   Braces: Knee immobilizer    Respiratory Status: Room air    Exams:  RLE ROM: Deficits: hip WFL; knee in KI; ankle WFL  RLE Strength: Deficits: hip 4/5; knee not tested; ankle 4+/5  LLE ROM: WFL  LLE Strength: Deficits: hip 4+/5; knee 4+/5; ankle 4+/5  Cognitive: Patient is oriented to Person, Place, Time, Situation  Sensation:    -       Impaired  kinesthesia bilateral LE due to residual effects of spinal; light touch diminished in feet; deep pressure intact    Functional Mobility:  Gait belt applied - Yes  Bed Mobility  Supine to Sit: minimum assistance and of 2 persons for LE management and trunk management  Sit to Supine: minimum assistance and of 2 persons for LE management and trunk management  Transfers  Sit to Stand: minimum assistance and of 2 persons with rolling walker and with cues for hand placement, foot placement, and weight bearing precautions  Bed to Chair: deferred due to poor kinesthetic awareness bilateral feet  Gait  Patient demonstrated poor kinesthetic awareness and poor ability to place feet appropriately due to residual effects of spinal; mod A x 2 with heavy verbal/tactile cues to sidestep to the HOB using RW/KI  Balance  Sitting: GOOD: Maintains balance through MODERATE excursions of active trunk movement  Standing: POOR: Needs MOD (moderate) assist during gait      Therapeutic Activities and Exercises:  Patient educated on role of acute care PT and PT POC, safety while in hospital including calling nurse for  mobility, and call light usage.  Educated about weightbearing precautions and provided cuing for adherence as appropriate during session.  Educated about importance of OOB mobility and remaining up in chair most of the day.  Management of knee immobilizer; use of ice for pain management    AM-PAC 6 CLICK MOBILITY  Total Score:12    Patient left HOB elevated with all lines intact, call button in reach, RN notified, bed alarm on, and spouse present.    GOALS:   Multidisciplinary Problems       Physical Therapy Goals          Problem: Physical Therapy    Goal Priority Disciplines Outcome Goal Variances Interventions   Physical Therapy Goal     PT, PT/OT Ongoing, Progressing     Description: Short Term Goals to be met by: 2023    Patient will increase functional independence with mobility by performin. Supine to sit with Modified [place]  2. Sit to stand transfer with Modified [place]  3. Bed to chair transfer with Modified [place] using Rolling Walker, WBAT right LE  4. Gait  x 100 feet with Modified [place] using Rolling Walker, WBAT right LE.   5. Lower extremity exercise program x30 reps per handout, with assistance as needed  6. Knee ROM 0-90    Long Term Goals to be met by: 10/14/2023    Pt will regain full independent functional mobility to return to prior activities of daily living.                        History:     Past Medical History:   Diagnosis Date    Diabetes mellitus, type 2        Past Surgical History:   Procedure Laterality Date    CEREBRAL ANEURYSM REPAIR       SECTION      deaf Right     since childhood    JOINT REPLACEMENT      LAPAROSCOPIC CHOLECYSTECTOMY      SHOULDER ARTHROSCOPY         Time Tracking:     PT Received On: 23  PT Start Time: 171  PT Stop Time: 1734  PT Total Time (min): 17 min     Billable Minutes: Evaluation Low complexity    2023

## 2023-08-14 NOTE — ANESTHESIA PROCEDURE NOTES
Spinal    Diagnosis: rt TKA, P OP PAIN  Patient location during procedure: OR  Start time: 8/14/2023 12:51 PM  Timeout: 8/14/2023 12:50 PM  End time: 8/14/2023 12:55 PM    Staffing  Authorizing Provider: Danny Montague MD  Performing Provider: Danny Montague MD    Staffing  Performed by: Danny Montague MD  Authorized by: Danny Montague MD    Preanesthetic Checklist  Completed: patient identified, IV checked, risks and benefits discussed, surgical consent, monitors and equipment checked, pre-op evaluation and timeout performed  Spinal Block  Patient position: sitting  Prep: Betadine  Patient monitoring: heart rate, continuous pulse ox, continuous capnometry and frequent blood pressure checks  Approach: midline  Location: L3-4  Injection technique: single shot  CSF Fluid: clear free-flowing CSF  Needle  Needle type: [place]   Needle gauge: 22 G  Needle length: 4 in  Needle localization: anatomical landmarks  Assessment  Sensory level: T8   Dermatomal levels determined by alcohol wipe  Ease of block: easy  Patient's tolerance of the procedure: comfortable throughout block and no complaints  Medications:    Medications: BUPivacaine (pf) (MARCAINE) injection 0.75% - Intraspinal   1.5 mL - 8/14/2023 1:16:00 PM

## 2023-08-14 NOTE — TRANSFER OF CARE
"Anesthesia Transfer of Care Note    Patient: Jessica Cruz    Procedure(s) Performed: Procedure(s) (LRB):  ARTHROPLASTY, KNEE, TOTAL, USING COMPUTER-ASSISTED NAVIGATION (Right)    Patient location: PACU    Anesthesia Type: general    Transport from OR: Transported from OR on 100% O2 by closed face mask with adequate spontaneous ventilation    Post pain: adequate analgesia    Post assessment: no apparent anesthetic complications    Post vital signs: stable    Level of consciousness: responds to stimulation    Nausea/Vomiting: no nausea/vomiting    Complications: none    Transfer of care protocol was followed      Last vitals:   Visit Vitals  BP (!) 120/41 (BP Location: Right arm, Patient Position: Lying)   Pulse 87   Temp 37 °C (98.6 °F) (Oral)   Resp 15   Ht 5' 2" (1.575 m)   Wt 93.4 kg (206 lb)   SpO2 96%   Breastfeeding No   BMI 37.68 kg/m²     "

## 2023-08-14 NOTE — DISCHARGE INSTRUCTIONS
ANKLE: Pumps        Point toes down, then up. Repeat 30 times, 2 sessions per day        Quad Set        With other leg bent, foot flat, slowly tighten muscles on right thigh of straight leg while counting out loud to 5.  Repeat 30 times, 2 sessions per day.          Hip Abduction / Adduction: with Extended Knee (Supine)        Bring right leg out to side and return. Keep knee straight.  Repeat 30 times, 2 sessions per day.         HIP / KNEE: Flexion, Heel Slides - Supine        Slide right heel up toward buttocks, keeping leg in straight line. Repeat 30 times, 2 sessions per day.  Use towel or pillowcase under heel as needed.       Straight Leg Raise        Bend left leg. Raise right leg 8-12 inches with knee locked. Exhale and tighten thigh muscles while raising leg.   Repeat 30 times, 2 sessions per day.           KNEE: Extension, Long Arc Quads - Sitting        Raise right leg until knee is straight.  Repeat 30 times, 2 sessions per day        KNEE: Flexion / Extension - Sitting        Sit at edge of surface, foot on towel or pillowcase. Bend and straighten right knee.  Repeat 30 times, 2 sessions per day.   Use opposite leg to increase knee flexion.      *Keep dressing dry and intact, do not remove dressing, if dressing becomes wet or bloody notify home health staff.  Swingbed/Home Health will change your dressing post of day #3 and day #10, give them that special dressing we sent home with you. If patient has a ROSA system leave on for 7 days then change dressing.  *Continue incentive spirometry at least every 2 hours while awake.  *Continue white stockings remove 2 times a day for 1 hour and replace. Once dressing is changed they will apply the other stocking to surgery leg.  *Elevate surgery leg at ankle on pillow, no pillow under knees.  *Take laxative of choice to have a bowel movement at least by tomorrow and then every other day.  *Increase fluids by mouth.  *Dr. Walt Duke total knees should wear knee  immobilizer at night and remove during the day.  *Staples will be removed at follow up appointment  *Notify [facility]/home health staff if any concerns.

## 2023-08-14 NOTE — PLAN OF CARE
Problem: Occupational Therapy  Goal: Occupational Therapy Goal  Description: STG: (in 1 week)  1.Pt will perform bathing with setup and min(A)  2.Pt will perform UE dressing with setup  3.Pt will perform LE dressing with setup and min(A)  4.Pt will transfer bed/chair/bsc with CGA with RW  5.Pt will perform standing task x 3 min with CGA with RW  6.Tolerate 15 min of tx without fatigue.      LTG: (in 4 weeks)  Restore to max I with selfcare and mobility.     Outcome: Ongoing, Progressing

## 2023-08-14 NOTE — PLAN OF CARE
[facility] - Orthopedic Periop Services  Initial Discharge Assessment       Primary Care Provider: Gisella, Primary Doctor    Admission Diagnosis: Osteoarthritis of right knee, unspecified osteoarthritis type [M17.11]    Admission Date: 8/14/2023  Expected Discharge Date:     Transition of Care Barriers: Mobility    Payor: HU[employer] MANAGED MEDICARE / Plan: HU[employer]NA MEDICARE HMO / Product Type: Capitation /     No emergency contact information on file.    Discharge Plan A: Skilled Nursing Facility  Discharge Plan B: [facility] - [facility] [address]  [address] [facility] [address]  Phone: [phone] Fax: [phone]    [facility] [address] [address]  Phone: [phone] Fax: [phone]    [facility] [address] [place] [address]  [address] [place] [id number]  Phone: [phone] Fax: [phone]      Initial Assessment (most recent)       Adult Discharge Assessment - 08/14/23 0937          Discharge Assessment    Assessment Type Discharge Planning Assessment     Source of Information patient;family     If unable to respond/provide information was family/caregiver contacted? Yes     Contact Name/Number Vlad Cruz (spouse) [phone]     Does patient/caregiver understand observation status Yes     Communicated BINTA with patient/caregiver Date not available/Unable to determine     People in Home spouse     Facility Arrived From: home     Do you expect to return to your current living situation? No     Do you have help at home or someone to help you manage your care at home? No     Prior to hospitilization cognitive status: Unable to Assess     Current cognitive status: Alert/Oriented     Walking or Climbing Stairs ambulation difficulty, assistance 1 person     Dressing/Bathing bathing difficulty, assistance 1 person     Home Accessibility not wheelchair  accessible     Home Layout Bathroom on 2nd floor;Bedroom on 2nd floor     Equipment Currently Used at Home none     Readmission within 30 days? No     Patient currently being followed by outpatient case management? No     Do you currently have service(s) that help you manage your care at home? No     Do you take prescription medications? Yes     Do you have prescription coverage? Yes     Do you have any problems affording any of your prescribed medications? No     Who is going to help you get home at discharge? [facility]     How do you get to doctors appointments? family or friend will provide     Are you on dialysis? No     Do you take coumadin? No     Discharge Plan A Skilled Nursing Facility     Discharge Plan B Home Health     DME Needed Upon Discharge  walker, rolling     Discharge Plan discussed with: Spouse/sig other;Patient     Name(s) and Number(s) Vlad Cruz (spouse) [phone]     Transition of Care Barriers Mobility        Physical Activity    On average, how many days per week do you engage in moderate to strenuous exercise (like a brisk walk)? 0 days     On average, how many minutes do you engage in exercise at this level? 0 min        Financial Resource Strain    How hard is it for you to pay for the very basics like food, housing, medical care, and heating? Not hard at all        Housing Stability    In the last 12 months, was there a time when you were not able to pay the mortgage or rent on time? No     In the last 12 months, how many places have you lived? 1     In the last 12 months, was there a time when you did not have a steady place to sleep or slept in a shelter (including now)? No        Transportation Needs    In the past 12 months, has lack of transportation kept you from medical appointments or from getting medications? No     In the past 12 months, has lack of transportation kept you from meetings, work, or from getting things needed for daily living? No        Food Insecurity     Within the past 12 months, you worried that your food would run out before you got the money to buy more. Never true     Within the past 12 months, the food you bought just didn't last and you didn't have money to get more. Never true        Stress    Do you feel stress - tense, restless, nervous, or anxious, or unable to sleep at night because your mind is troubled all the time - these days? Not at all        Social Connections    In a typical week, how many times do you talk on the phone with family, friends, or neighbors? Twice a week     How often do you get together with friends or relatives? Twice a week     How often do you attend [employer] or Church services? More than 4 times per year     Do you belong to any clubs or organizations such as Amish groups, unions, fraternal or athletic groups, or school groups? No     How often do you attend meetings of the clubs or organizations you belong to? Never     Are you , , , , never , or living with a partner?         Alcohol Use    Q1: How often do you have a drink containing alcohol? 4 or more times a week     Q2: How many drinks containing alcohol do you have on a typical day when you are drinking? 1 or 2     Q3: How often do you have six or more drinks on one occasion? Never        OTHER    Name(s) of People in Home Vlad Cruz (spouse) [phone]                   Sw spoke with pt and Vlad Anthony (spouse) [phone] at bedside. Pta, pt lived home with spouse. No hh, no dme. Pt states that dcp is swingbed at 1. [facility] or 2. [facility]. Referral sending to [facility] at this time. Brad f/u with Randy uQach, RN at [facility], who stated that there is possibility that pt will be accepted on tomorrow. Pt will need insurance precert.

## 2023-08-15 PROBLEM — G62.9 NEUROPATHY: Status: ACTIVE | Noted: 2023-08-15

## 2023-08-15 PROBLEM — E11.9 DM (DIABETES MELLITUS): Status: ACTIVE | Noted: 2023-08-15

## 2023-08-15 PROBLEM — M19.90 OSTEOARTHRITIS: Status: ACTIVE | Noted: 2023-08-15

## 2023-08-15 PROBLEM — N32.81 OVERACTIVE BLADDER: Status: ACTIVE | Noted: 2023-08-15

## 2023-08-15 LAB
ANION GAP SERPL CALCULATED.3IONS-SCNC: 3 MMOL/L (ref 7–16)
BASOPHILS # BLD AUTO: 0.02 K/UL (ref 0–0.2)
BASOPHILS NFR BLD AUTO: 0.4 % (ref 0–1)
BUN SERPL-MCNC: 17 MG/DL (ref 7–18)
BUN/CREAT SERPL: 25 (ref 6–20)
CALCIUM SERPL-MCNC: 8.3 MG/DL (ref 8.5–10.1)
CHLORIDE SERPL-SCNC: 108 MMOL/L (ref 98–107)
CO2 SERPL-SCNC: 29 MMOL/L (ref 21–32)
CREAT SERPL-MCNC: 0.68 MG/DL (ref 0.55–1.02)
DIFFERENTIAL METHOD BLD: ABNORMAL
EGFR (NO RACE VARIABLE) (RUSH/TITUS): 92 ML/MIN/1.73M2
EOSINOPHIL # BLD AUTO: 0.05 K/UL (ref 0–0.5)
EOSINOPHIL NFR BLD AUTO: 0.9 % (ref 1–4)
ERYTHROCYTE [DISTWIDTH] IN BLOOD BY AUTOMATED COUNT: 14.8 % (ref 11.5–14.5)
GLUCOSE SERPL-MCNC: 117 MG/DL (ref 70–105)
GLUCOSE SERPL-MCNC: 131 MG/DL (ref 74–106)
GLUCOSE SERPL-MCNC: 153 MG/DL (ref 70–105)
GLUCOSE SERPL-MCNC: 186 MG/DL (ref 70–105)
GLUCOSE SERPL-MCNC: 199 MG/DL (ref 70–105)
HCT VFR BLD AUTO: 32.7 % (ref 38–47)
HGB BLD-MCNC: 10.4 G/DL (ref 12–16)
IMM GRANULOCYTES # BLD AUTO: 0.01 K/UL (ref 0–0.04)
IMM GRANULOCYTES NFR BLD: 0.2 % (ref 0–0.4)
LYMPHOCYTES # BLD AUTO: 1.16 K/UL (ref 1–4.8)
LYMPHOCYTES NFR BLD AUTO: 21.7 % (ref 27–41)
MCH RBC QN AUTO: 30.2 PG (ref 27–31)
MCHC RBC AUTO-ENTMCNC: 31.8 G/DL (ref 32–36)
MCV RBC AUTO: 95.1 FL (ref 80–96)
MONOCYTES # BLD AUTO: 0.53 K/UL (ref 0–0.8)
MONOCYTES NFR BLD AUTO: 9.9 % (ref 2–6)
MPC BLD CALC-MCNC: 11.4 FL (ref 9.4–12.4)
NEUTROPHILS # BLD AUTO: 3.57 K/UL (ref 1.8–7.7)
NEUTROPHILS NFR BLD AUTO: 66.9 % (ref 53–65)
NRBC # BLD AUTO: 0 X10E3/UL
NRBC, AUTO (.00): 0 %
PLATELET # BLD AUTO: 210 K/UL (ref 150–400)
POTASSIUM SERPL-SCNC: 4.1 MMOL/L (ref 3.5–5.1)
RBC # BLD AUTO: 3.44 M/UL (ref 4.2–5.4)
SODIUM SERPL-SCNC: 136 MMOL/L (ref 136–145)
WBC # BLD AUTO: 5.34 K/UL (ref 4.5–11)

## 2023-08-15 PROCEDURE — 99223 1ST HOSP IP/OBS HIGH 75: CPT | Mod: ,,, | Performed by: INTERNAL MEDICINE

## 2023-08-15 PROCEDURE — 85025 COMPLETE CBC W/AUTO DIFF WBC: CPT | Performed by: ORTHOPAEDIC SURGERY

## 2023-08-15 PROCEDURE — 97116 GAIT TRAINING THERAPY: CPT

## 2023-08-15 PROCEDURE — 80048 BASIC METABOLIC PNL TOTAL CA: CPT | Performed by: ORTHOPAEDIC SURGERY

## 2023-08-15 PROCEDURE — 25000003 PHARM REV CODE 250: Performed by: ORTHOPAEDIC SURGERY

## 2023-08-15 PROCEDURE — 82962 GLUCOSE BLOOD TEST: CPT

## 2023-08-15 PROCEDURE — 63600175 PHARM REV CODE 636 W HCPCS: Performed by: ORTHOPAEDIC SURGERY

## 2023-08-15 PROCEDURE — 97110 THERAPEUTIC EXERCISES: CPT

## 2023-08-15 PROCEDURE — 97535 SELF CARE MNGMENT TRAINING: CPT

## 2023-08-15 PROCEDURE — 94761 N-INVAS EAR/PLS OXIMETRY MLT: CPT | Mod: GZ

## 2023-08-15 PROCEDURE — 99223 PR INITIAL HOSPITAL CARE,LEVL III: ICD-10-PCS | Mod: ,,, | Performed by: INTERNAL MEDICINE

## 2023-08-15 RX ORDER — HYDRALAZINE HYDROCHLORIDE 20 MG/ML
5 INJECTION INTRAMUSCULAR; INTRAVENOUS EVERY 6 HOURS PRN
Status: DISCONTINUED | OUTPATIENT
Start: 2023-08-15 | End: 2023-08-16 | Stop reason: HOSPADM

## 2023-08-15 RX ORDER — IBUPROFEN 200 MG
16 TABLET ORAL
Status: DISCONTINUED | OUTPATIENT
Start: 2023-08-15 | End: 2023-08-16 | Stop reason: HOSPADM

## 2023-08-15 RX ORDER — GLUCAGON 1 MG
1 KIT INJECTION
Status: DISCONTINUED | OUTPATIENT
Start: 2023-08-15 | End: 2023-08-16 | Stop reason: HOSPADM

## 2023-08-15 RX ORDER — HEPARIN SODIUM 5000 [USP'U]/ML
5000 INJECTION, SOLUTION INTRAVENOUS; SUBCUTANEOUS EVERY 8 HOURS
Status: DISCONTINUED | OUTPATIENT
Start: 2023-08-15 | End: 2023-08-15

## 2023-08-15 RX ORDER — IBUPROFEN 200 MG
24 TABLET ORAL
Status: DISCONTINUED | OUTPATIENT
Start: 2023-08-15 | End: 2023-08-16 | Stop reason: HOSPADM

## 2023-08-15 RX ORDER — INSULIN ASPART 100 [IU]/ML
0-5 INJECTION, SOLUTION INTRAVENOUS; SUBCUTANEOUS
Status: DISCONTINUED | OUTPATIENT
Start: 2023-08-15 | End: 2023-08-16 | Stop reason: HOSPADM

## 2023-08-15 RX ADMIN — POLYETHYLENE GLYCOL 3350 17 G: 17 POWDER, FOR SOLUTION ORAL at 10:08

## 2023-08-15 RX ADMIN — APIXABAN 5 MG: 5 TABLET, FILM COATED ORAL at 09:08

## 2023-08-15 RX ADMIN — OXYCODONE HYDROCHLORIDE 10 MG: 5 TABLET ORAL at 04:08

## 2023-08-15 RX ADMIN — OXYBUTYNIN CHLORIDE 5 MG: 5 TABLET ORAL at 10:08

## 2023-08-15 RX ADMIN — PREGABALIN 75 MG: 75 CAPSULE ORAL at 09:08

## 2023-08-15 RX ADMIN — ASPIRIN 81 MG 324 MG: 81 TABLET ORAL at 10:08

## 2023-08-15 RX ADMIN — MUPIROCIN 1 G: 20 OINTMENT TOPICAL at 09:08

## 2023-08-15 RX ADMIN — DULOXETINE HYDROCHLORIDE 60 MG: 30 CAPSULE, DELAYED RELEASE ORAL at 10:08

## 2023-08-15 RX ADMIN — ACETAMINOPHEN 1000 MG: 500 TABLET ORAL at 06:08

## 2023-08-15 RX ADMIN — OXYCODONE HYDROCHLORIDE 10 MG: 5 TABLET ORAL at 01:08

## 2023-08-15 RX ADMIN — DOCUSATE SODIUM 100 MG: 100 CAPSULE, LIQUID FILLED ORAL at 09:08

## 2023-08-15 RX ADMIN — OXYBUTYNIN CHLORIDE 5 MG: 5 TABLET ORAL at 09:08

## 2023-08-15 RX ADMIN — DOCUSATE SODIUM 100 MG: 100 CAPSULE, LIQUID FILLED ORAL at 10:08

## 2023-08-15 RX ADMIN — MUPIROCIN 1 G: 20 OINTMENT TOPICAL at 10:08

## 2023-08-15 RX ADMIN — CEFAZOLIN 2 G: 2 INJECTION, POWDER, FOR SOLUTION INTRAMUSCULAR; INTRAVENOUS at 06:08

## 2023-08-15 RX ADMIN — PREGABALIN 75 MG: 75 CAPSULE ORAL at 10:08

## 2023-08-15 RX ADMIN — CELECOXIB 200 MG: 100 CAPSULE ORAL at 09:08

## 2023-08-15 RX ADMIN — FAMOTIDINE 20 MG: 20 TABLET, FILM COATED ORAL at 09:08

## 2023-08-15 RX ADMIN — ACETAMINOPHEN 1000 MG: 500 TABLET ORAL at 01:08

## 2023-08-15 RX ADMIN — CELECOXIB 200 MG: 100 CAPSULE ORAL at 10:08

## 2023-08-15 NOTE — PT/OT/SLP PROGRESS
"Physical Therapy Treatment    Patient Name:  Jessica Cruz   MRN:  [id number]    Recommendations:     Discharge Recommendations: rehabilitation facility, nursing facility, skilled  Discharge Equipment Recommendations: walker, rolling  Barriers to discharge: sed functional mobility, high fall risk    Assessment:     Jessica Cruz is a 73 y.o. female admitted with a medical diagnosis of Osteoarthritis.  She presents with the following impairments/functional limitations: weakness, impaired endurance, impaired self care skills, impaired functional mobility, gait instability, impaired balance, impaired cognition, decreased lower extremity function, pain, decreased ROM .    Patient more alert than during am treatment but required heavy verbal cues for sequencing and safety during mobility. Easy fatigue and high fall risk. Patient will benefit from swing bed/rehab at d/c to optimize return to safe, independent mobility. Increased pain complaint this afternoon.    Rehab Prognosis: Good; patient would benefit from acute skilled PT services to address these deficits and reach maximum level of function.    Recent Surgery: Procedure(s) (LRB):  ARTHROPLASTY, KNEE, TOTAL, USING COMPUTER-ASSISTED NAVIGATION (Right) 1 Day Post-Op    Plan:     During this hospitalization, patient to be seen BID (5x/week; daily 2x/week) to address the identified rehab impairments via therapeutic activities, therapeutic exercises, gait training and progress toward the following goals:    Plan of Care Expires:  09/14/23    Subjective     Chief Complaint: right TKR  Patient/Family Comments/goals: "I am seeing things like an area rug coming out from under the bed." Patient reassured that what she was seeing was the shadow of the bed.  Pain/Comfort:  Pain Rating 1: 4/10 (at rest; 6/10 during gait/ex)  Location - Side 1: Right  Location 1: knee  Pain Addressed 1: Reposition, Distraction, Cessation of Activity, Nurse notified  Pain Rating " Post-Intervention 1: 4/10      Objective:     Communicated with Elvis Malik LPN prior to session.  Patient found  sitting on the toilet  with peripheral IV upon PT entry to room.     General Precautions: Standard, fall  Orthopedic Precautions: RLE weight bearing as tolerated  Braces: Knee immobilizer  Respiratory Status: Room air     Functional Mobility:  Bed Mobility:     Sit to Supine: minimum assistance  Transfers:     Sit to Stand:  moderate assistance and verbal cues with rolling walker from low toilet.  Gait: 25' using RW, CGA and near constant verbal cues for sequencing and safety; short step lengths, pt maintaining mild knee flexion in stance phase, easy fatigue. Trial completed in 15 minutes.      AM-PAC 6 CLICK MOBILITY  Turning over in bed (including adjusting bedclothes, sheets and blankets)?: 3  Sitting down on and standing up from a chair with arms (e.g., wheelchair, bedside commode, etc.): 3  Moving from lying on back to sitting on the side of the bed?: 3  Moving to and from a bed to a chair (including a wheelchair)?: 3  Need to walk in hospital room?: 3  Climbing 3-5 steps with a railing?: 2  Basic Mobility Total Score: 17       Treatment & Education:  Right lower extremity exercise x 30 reps: ankle pumps, Quad sets, glut sets, long arc quads, heel slides, hip abduction/adduction, and straight leg raises with active assist ROM, verbal cues, and tactile cues     Seated knee flexion stretch with prolonged hold    Right knee ROM: 8-70, increased c/o pain compared with am treatment; RN in to administer pain medicine    Gait per above     Patient left HOB elevated with all lines intact, call button in reach, bed alarm on, Elvis Malik LPN notified, and spouse present..    GOALS:   Multidisciplinary Problems       Physical Therapy Goals          Problem: Physical Therapy    Goal Priority Disciplines Outcome Goal Variances Interventions   Physical Therapy Goal     PT, PT/OT Ongoing, Progressing      Description: Short Term Goals to be met by: 2023    Patient will increase functional independence with mobility by performin. Supine to sit with Modified [place]  2. Sit to stand transfer with Modified [place]  3. Bed to chair transfer with Modified [place] using Rolling Walker, WBAT right LE  4. Gait  x 100 feet with Modified [place] using Rolling Walker, WBAT right LE.   5. Lower extremity exercise program x30 reps per handout, with assistance as needed  6. Knee ROM 0-90    Long Term Goals to be met by: 10/14/2023    Pt will regain full independent functional mobility to return to prior activities of daily living.                        Time Tracking:     PT Received On: 08/15/23  PT Start Time: 1317     PT Stop Time: 1347  PT Total Time (min): 30 min     Billable Minutes: Gait Training 15 minutes and Therapeutic Exercise 15 minutes    Treatment Type: Treatment  PT/PTA: PT     Number of PTA visits since last PT visit: 0     08/15/2023

## 2023-08-15 NOTE — PT/OT/SLP PROGRESS
"Physical Therapy Treatment    Patient Name:  Jessica Cruz   MRN:  [id number]    Recommendations:     Discharge Recommendations: rehabilitation facility, nursing facility, skilled  Discharge Equipment Recommendations: walker, rolling  Barriers to discharge:  decreased functional mobility; confusion    Assessment:     Jessica Cruz is a 73 y.o. female admitted with a medical diagnosis of Osteoarthritis.  She presents with the following impairments/functional limitations: weakness, impaired endurance, impaired self care skills, impaired functional mobility, gait instability, impaired balance, impaired cognition, decreased lower extremity function, pain, decreased ROM .    Patient mildly confused and drowsy due to pain medicine but agreeable to PT treatment. Patient required frequent verbal cues to remain on task. Patient would benefit from swing bed at d/c to optimize safe functional mobility and accelerate TKR rehab.    Rehab Prognosis: Good; patient would benefit from acute skilled PT services to address these deficits and reach maximum level of function.    Recent Surgery: Procedure(s) (LRB):  ARTHROPLASTY, KNEE, TOTAL, USING COMPUTER-ASSISTED NAVIGATION (Right) 1 Day Post-Op    Plan:     During this hospitalization, patient to be seen BID (5x/week; daily 2x/week) to address the identified rehab impairments via gait training, therapeutic activities, therapeutic exercises and progress toward the following goals:    Plan of Care Expires:  09/14/23    Subjective     Chief Complaint: right TKR  Patient/Family Comments/goals: "I thought I was at home. I can't seem to wake up since I got that pain medicine this morning."  Pain/Comfort:  Pain Rating 1: 4/10  Location - Side 1: Right  Location 1: knee  Pain Addressed 1: Pre-medicate for activity, Reposition, Distraction, Cessation of Activity  Pain Rating Post-Intervention 1: 4/10      Objective:     Communicated with Elvis Malik LPN prior to session.  Patient found " up in chair with peripheral IV upon PT entry to room.     General Precautions: Standard, fall  Orthopedic Precautions: RLE weight bearing as tolerated  Braces: Knee immobilizer  Respiratory Status: Room air     Functional Mobility:  Bed Mobility:     Supine to Sit: minimum assistance with assist of RN. Patient in the chair on PT arrival  Transfers:     Sit to Stand:  minimum assistance and ehavy verbal cues for sequencing/safety  with rolling walker  Gait: 20' using RW, CGA and heavy verbal cues; step to pattern, slow shivani, difficulty sequencing, no LOB but easy fatigue      AM-PAC 6 CLICK MOBILITY  Turning over in bed (including adjusting bedclothes, sheets and blankets)?: 3  Sitting down on and standing up from a chair with arms (e.g., wheelchair, bedside commode, etc.): 3  Moving from lying on back to sitting on the side of the bed?: 3  Moving to and from a bed to a chair (including a wheelchair)?: 3  Need to walk in hospital room?: 3  Climbing 3-5 steps with a railing?: 2  Basic Mobility Total Score: 17       Treatment & Education:  Right lower extremity exercise x 30 reps: ankle pumps, Quad sets, glut sets, long arc quads, heel slides, hip abduction/adduction, and straight leg raises with active assist ROM, verbal cues, and tactile cues     Seated knee flexion stretch with prolonged hold    Right knee ROM: 8-75    Gait per above     Patient left up in chair with all lines intact, call button in reach, Elvis Malik, TELMAN notified, and pillow under ankle to promote knee extension..    GOALS:   Multidisciplinary Problems       Physical Therapy Goals          Problem: Physical Therapy    Goal Priority Disciplines Outcome Goal Variances Interventions   Physical Therapy Goal     PT, PT/OT Ongoing, Progressing     Description: Short Term Goals to be met by: 2023    Patient will increase functional independence with mobility by performin. Supine to sit with Modified [place]  2. Sit to stand  transfer with Modified [place]  3. Bed to chair transfer with Modified [place] using Rolling Walker, WBAT right LE  4. Gait  x 100 feet with Modified [place] using Rolling Walker, WBAT right LE.   5. Lower extremity exercise program x30 reps per handout, with assistance as needed  6. Knee ROM 0-90    Long Term Goals to be met by: 10/14/2023    Pt will regain full independent functional mobility to return to prior activities of daily living.                        Time Tracking:     PT Received On: 08/15/23  PT Start Time: 0810     PT Stop Time: 0844  PT Total Time (min): 34 min     Billable Minutes: Gait Training 15 minutes and Therapeutic Exercise 15 minutes    Treatment Type: Treatment  PT/PTA: PT     Number of PTA visits since last PT visit: 0     08/15/2023

## 2023-08-15 NOTE — CONSULTS
[facility] - [address] [place] [address] Medicine  Consult Note    Patient Name: Jessica Cruz  MRN: [id number]  Admission Date: 2023  Hospital Length of Stay: 0 days  Attending Physician: Walt Duke MD   Primary Care Provider: Gisella, Primary Doctor           Patient information was obtained from patient.     Consults  Subjective:     Principal Problem: Osteoarthritis    Chief Complaint: No chief complaint on file.       HPI: Pt is a 74 y/o f w/ PMHx of dm, overactive bladder, neuropathy, OA s/p right Total Knee Arthroplasty w/ orthosurgery, hospitalist team consulted for medical mx. Pt is seen in the am, she says her pain is adequately controlled, says she follows w/ Dr Lowry for her PCP. She has no new complains, says she is awaiting swing bed. I did med rec with her. She says she stopped taking elequis since she completed 6 months of it for dvt, says dr lowry repeated her leg us and she had no clots so since then she has been off of blood thinners. Currently she denies any CP, SOB, F/C/N/V/D.      Past Medical History:   Diagnosis Date    Diabetes mellitus, type 2        Past Surgical History:   Procedure Laterality Date    ARTHROPLASTY, KNEE, TOTAL, USING COMPUTER-ASSISTED NAVIGATION Right 2023    Procedure: ARTHROPLASTY, KNEE, TOTAL, USING COMPUTER-ASSISTED NAVIGATION;  Surgeon: Walt Duke MD;  Location: [facility];  Service: Orthopedics;  Laterality: Right;    CEREBRAL ANEURYSM REPAIR       SECTION      deaf Right     since childhood    JOINT REPLACEMENT      LAPAROSCOPIC CHOLECYSTECTOMY      SHOULDER ARTHROSCOPY         Review of patient's allergies indicates:   Allergen Reactions    Latex, natural rubber Itching       No current facility-administered medications on file prior to encounter.     Current Outpatient Medications on File Prior to Encounter   Medication Sig    aspirin (ECOTRIN) 81 MG EC tablet Take 81 mg by mouth once daily.    DULoxetine  (CYMBALTA) 60 MG capsule Take 60 mg by mouth once daily.    glimepiride (AMARYL) 2 MG tablet Take 4 mg by mouth 2 (two) times a day.    metFORMIN (GLUCOPHAGE) 1000 MG tablet Take 500 mg by mouth 2 (two) times a day.    oxybutynin (DITROPAN) 5 MG Tab Take 5 mg by mouth 2 (two) times daily.     Family History       Problem Relation (Age of Onset)    Cancer Mother, Father    Heart disease Mother    Hypoglycemic Mother          Tobacco Use    Smoking status: Former     Current packs/day: 0.00    Smokeless tobacco: Never    Tobacco comments:     chews nicotine gum   Substance and Sexual Activity    Alcohol use: Yes     Alcohol/week: 14.0 standard drinks of alcohol     Types: 14 Glasses of wine per week    Drug use: Never    Sexual activity: Yes     Review of Systems   Constitutional: Negative.  Negative for chills, fatigue and fever.   HENT: Negative.  Negative for congestion and rhinorrhea.    Respiratory:  Negative for apnea, cough, chest tightness and shortness of breath.    Cardiovascular: Negative.  Negative for chest pain and leg swelling.   Gastrointestinal: Negative.  Negative for abdominal pain, diarrhea, nausea and vomiting.   Endocrine: Negative.    Genitourinary: Negative.    Musculoskeletal:  Positive for arthralgias.   Skin: Negative.    Allergic/Immunologic: Negative.    Neurological: Negative.    Hematological: Negative.    Psychiatric/Behavioral: Negative.       Objective:     Vital Signs (Most Recent):  Temp: 97.9 °F (36.6 °C) (08/15/23 0705)  Pulse: 74 (08/15/23 0705)  Resp: 18 (08/15/23 0705)  BP: (!) 115/52 (08/15/23 0705)  SpO2: (!) 92 % (08/15/23 0705) Vital Signs (24h Range):  Temp:  [97.5 °F (36.4 °C)-98.6 °F (37 °C)] 97.9 °F (36.6 °C)  Pulse:  [65-91] 74  Resp:  [10-20] 18  SpO2:  [92 %-100 %] 92 %  BP: (106-134)/(39-61) 115/52     Weight: 93.4 kg (206 lb)  Body mass index is 37.68 kg/m².     Physical Exam  Vitals reviewed.   Constitutional:       Appearance: Normal appearance.   HENT:       "Head: Normocephalic and atraumatic.      Nose: Nose normal. No congestion.      Mouth/Throat:      Pharynx: Oropharynx is clear.   Eyes:      Extraocular Movements: Extraocular movements intact.      Conjunctiva/sclera: Conjunctivae normal.      Pupils: Pupils are equal, round, and reactive to light.   Cardiovascular:      Rate and Rhythm: Normal rate and regular rhythm.      Pulses: Normal pulses.      Heart sounds: Normal heart sounds.   Pulmonary:      Effort: Pulmonary effort is normal. No respiratory distress.      Breath sounds: Normal breath sounds. No wheezing.   Abdominal:      General: Bowel sounds are normal. There is no distension.      Palpations: Abdomen is soft.      Tenderness: There is no abdominal tenderness.   Musculoskeletal:         General: Tenderness present.   Lymphadenopathy:      Cervical: No cervical adenopathy.   Skin:     General: Skin is warm.   Neurological:      Mental Status: She is alert and oriented to person, place, and time. Mental status is at baseline.   Psychiatric:         Mood and Affect: Mood normal.          Significant Labs: All pertinent labs within the past 24 hours have been reviewed.    Significant Imaging: I have reviewed all pertinent imaging results/findings within the past 24 hours.    Assessment/Plan:     * Osteoarthritis  SP right Total Knee Arthroplasty  Management per primary  Dvt ppx per primary       Overactive bladder  Cont oxybutynin       Neuropathy  Cont home medicine      DM (diabetes mellitus)  Patient's FSGs are controlled on current medication regimen.  Last A1c reviewed- No results found for: "LABA1C", "HGBA1C"  Most recent fingerstick glucose reviewed- No results for input(s): "POCTGLUCOSE" in the last 24 hours.  Current correctional scale  Low  Maintain anti-hyperglycemic dose as follows-   Antihyperglycemics (From admission, onward)      Start     Stop Route Frequency Ordered    08/15/23 1051  insulin aspart U-100 injection 0-5 Units         -- " SubQ Before meals & nightly PRN 08/15/23 0951          Hold Oral hypoglycemics while patient is in the hospital.      VTE Risk Mitigation (From admission, onward)           Ordered     heparin (porcine) injection 5,000 Units  Every 8 hours         08/15/23 0956     IP VTE LOW RISK PATIENT  Once         08/14/23 1725     Place HARPER hose  Until discontinued         08/14/23 1725     Place sequential compression device  Until discontinued         08/14/23 1725                        Thank you for your consult. I will follow-up with patient. Please contact us if you have any additional questions.    Heribertomaellyn Lakhani MD  Department of Hospital Medicine   [facility] - [address] [place] [address]

## 2023-08-15 NOTE — PROGRESS NOTES
"Daily Orthopaedic Progress Note    Jessica Cruz is a 73 y.o. female admitted on 8/14/2023  Hospital Day: 0  Post Op Day: 1 Day Post-Op    Antibiotics (From admission, onward)      Start     Stop Route Frequency Ordered    08/14/23 2100  mupirocin 2 % ointment 1 g         08/19/23 2059 Nasl 2 times daily 08/14/23 1725             The patient was seen and examined this morning at the bedside. Patient reports no acute issues overnight and adequate control of pain on current regimen.  Patient worked with physical therapy over the last 24 hours.      PHYSICAL EXAM:  Awake/alert/oriented x3, No acute distress, Afebrile, Vital signs stable  Normocephalic, Atraumatic  Good inspiratory effort with unlaboured breathing  Dressings clean/dry/intact, Operative limb neurovascularly intact with 5/5 strength distally and sensation intact to light touch distally; some decreased sensation over the area of the regional block and palpable pulses  Vitals:    08/15/23 1100 08/15/23 1340 08/15/23 1448 08/15/23 1607   BP: (!) 130/56  (!) 130/56 (!) 177/64   BP Location: Right arm   Left arm   Patient Position: Sitting   Lying   Pulse: 70  70 (!) 59   Resp: 19 19 19 18   Temp: 97.8 °F (36.6 °C)  97.8 °F (36.6 °C) 97.7 °F (36.5 °C)   TempSrc: Oral   Oral   SpO2: (!) 93%  (!) 93% (!) 91%   Weight:   93.4 kg (206 lb)    Height:   5' 2" (1.575 m)      I/O last 3 completed shifts:  In: 1050 [I.V.:300; IV Piggyback:750]  Out: -     Significant Labs:  Recent Lab Results  (Last 5 results in the past 24 hours)        08/15/23  1521   08/15/23  1034   08/15/23  0426   08/15/23  0359   08/14/23  2041        Anion Gap     3           Baso #     0.02           Basophil %     0.4           BUN     17           BUN/CREAT RATIO     25           Calcium     8.3           Chloride     108           CO2     29           Creatinine     0.68           Differential Type     Auto           eGFR     92           Eos #     0.05           Eosinophil %     0.9     "       Glucose     131           Hematocrit     32.7           Hemoglobin     10.4           Immature Grans (Abs)     0.01           Immature Granulocytes     0.2           Lymph #     1.16           Lymph %     21.7           MCH     30.2           MCHC     31.8           MCV     95.1           Mono #     0.53           Mono %     9.9           MPV     11.4           Neutrophils, Abs     3.57           Neutrophils Relative     66.9           nRBC     0.0           NUCLEATED RBC ABSOLUTE     0.00           Platelets     210           POC Glucose 186   199     117   141       Potassium     4.1           RBC     3.44           RDW     14.8           Sodium     136           WBC     5.34                                  Significant Diagnostics:  X-Ray Knee 1 or 2 View Right  Narrative: EXAMINATION:  XR KNEE 1 OR 2 VIEW RIGHT    CLINICAL HISTORY:  Unilateral primary osteoarthritis, right kneepo;    COMPARISON:  Right knee x-ray August 3, 2023    TECHNIQUE:  Frontal and lateral views of the right knee.    FINDINGS:  Status post total right knee arthroplasty. No evidence of immediate hardware failure.  Superficial skin staples overlie the knee. Subcutaneous and joint space air noted which is likely postoperative.  Impression: Status post total right knee arthroplasty.    Point of Service: [facility]    Electronically signed by: Duc Madrid  Date:    08/14/2023  Time:    15:43       A/P: 73 y.o. female 1 Day Post-Op s/p rt tka  -Continue with current pain control regimen  -Continue with current physical therapy plan  -Continue with DVT prophylaxis- will add eliquis  -Anticipate discharge to swing bed tomorrow    Walt Duke MD  Orthopaedic Staff Surgeon

## 2023-08-15 NOTE — ASSESSMENT & PLAN NOTE
SP right Total Knee Arthroplasty  Management per primary  Pt has recent h/o dvt , will therfore add dvt ppx to prevent any recurrence in the setting of recent surgery

## 2023-08-15 NOTE — SUBJECTIVE & OBJECTIVE
Past Medical History:   Diagnosis Date    Diabetes mellitus, type 2        Past Surgical History:   Procedure Laterality Date    ARTHROPLASTY, KNEE, TOTAL, USING COMPUTER-ASSISTED NAVIGATION Right 2023    Procedure: ARTHROPLASTY, KNEE, TOTAL, USING COMPUTER-ASSISTED NAVIGATION;  Surgeon: Walt Duke MD;  Location: [facility];  Service: Orthopedics;  Laterality: Right;    CEREBRAL ANEURYSM REPAIR       SECTION      deaf Right     since childhood    JOINT REPLACEMENT      LAPAROSCOPIC CHOLECYSTECTOMY      SHOULDER ARTHROSCOPY         Review of patient's allergies indicates:   Allergen Reactions    Latex, natural rubber Itching       No current facility-administered medications on file prior to encounter.     Current Outpatient Medications on File Prior to Encounter   Medication Sig    aspirin (ECOTRIN) 81 MG EC tablet Take 81 mg by mouth once daily.    DULoxetine (CYMBALTA) 60 MG capsule Take 60 mg by mouth once daily.    glimepiride (AMARYL) 2 MG tablet Take 4 mg by mouth 2 (two) times a day.    metFORMIN (GLUCOPHAGE) 1000 MG tablet Take 500 mg by mouth 2 (two) times a day.    oxybutynin (DITROPAN) 5 MG Tab Take 5 mg by mouth 2 (two) times daily.     Family History       Problem Relation (Age of Onset)    Cancer Mother, Father    Heart disease Mother    Hypoglycemic Mother          Tobacco Use    Smoking status: Former     Current packs/day: 0.00    Smokeless tobacco: Never    Tobacco comments:     chews nicotine gum   Substance and Sexual Activity    Alcohol use: Yes     Alcohol/week: 14.0 standard drinks of alcohol     Types: 14 Glasses of wine per week    Drug use: Never    Sexual activity: Yes     Review of Systems   Constitutional: Negative.  Negative for chills, fatigue and fever.   HENT: Negative.  Negative for congestion and rhinorrhea.    Respiratory:  Negative for apnea, cough, chest tightness and shortness of breath.    Cardiovascular: Negative.  Negative for chest pain and leg  swelling.   Gastrointestinal: Negative.  Negative for abdominal pain, diarrhea, nausea and vomiting.   Endocrine: Negative.    Genitourinary: Negative.    Musculoskeletal:  Positive for arthralgias.   Skin: Negative.    Allergic/Immunologic: Negative.    Neurological: Negative.    Hematological: Negative.    Psychiatric/Behavioral: Negative.       Objective:     Vital Signs (Most Recent):  Temp: 97.9 °F (36.6 °C) (08/15/23 0705)  Pulse: 74 (08/15/23 0705)  Resp: 18 (08/15/23 0705)  BP: (!) 115/52 (08/15/23 0705)  SpO2: (!) 92 % (08/15/23 0705) Vital Signs (24h Range):  Temp:  [97.5 °F (36.4 °C)-98.6 °F (37 °C)] 97.9 °F (36.6 °C)  Pulse:  [65-91] 74  Resp:  [10-20] 18  SpO2:  [92 %-100 %] 92 %  BP: (106-134)/(39-61) 115/52     Weight: 93.4 kg (206 lb)  Body mass index is 37.68 kg/m².     Physical Exam  Vitals reviewed.   Constitutional:       Appearance: Normal appearance.   HENT:      Head: Normocephalic and atraumatic.      Nose: Nose normal. No congestion.      Mouth/Throat:      Pharynx: Oropharynx is clear.   Eyes:      Extraocular Movements: Extraocular movements intact.      Conjunctiva/sclera: Conjunctivae normal.      Pupils: Pupils are equal, round, and reactive to light.   Cardiovascular:      Rate and Rhythm: Normal rate and regular rhythm.      Pulses: Normal pulses.      Heart sounds: Normal heart sounds.   Pulmonary:      Effort: Pulmonary effort is normal. No respiratory distress.      Breath sounds: Normal breath sounds. No wheezing.   Abdominal:      General: Bowel sounds are normal. There is no distension.      Palpations: Abdomen is soft.      Tenderness: There is no abdominal tenderness.   Musculoskeletal:         General: Tenderness present.   Lymphadenopathy:      Cervical: No cervical adenopathy.   Skin:     General: Skin is warm.   Neurological:      Mental Status: She is alert and oriented to person, place, and time. Mental status is at baseline.   Psychiatric:         Mood and Affect: Mood  normal.          Significant Labs: All pertinent labs within the past 24 hours have been reviewed.    Significant Imaging: I have reviewed all pertinent imaging results/findings within the past 24 hours.

## 2023-08-15 NOTE — PLAN OF CARE
Sw spoke with Nurse Anila at [facility]. Anila informed sw that precert is pending. Sw spoke with spouse at bedside, as pt was in restroom. Sw explained that there is not guarantee that pt will receive precert on today. Home health choice obtained for [facility]. Referral sending at this time. Rw being called in to [facility].

## 2023-08-15 NOTE — HPI
Pt is a 74 y/o f w/ PMHx of dm, overactive bladder, neuropathy, OA s/p right Total Knee Arthroplasty w/ orthosurgery, hospitalist team consulted for medical mx. Pt is seen in the am, she says her pain is adequately controlled, says she follows w/ Dr Adams for her PCP. She has no new complains, says she is awaiting swing bed. I did med rec with her. She says she stopped taking elequis since she completed 6 months of it for dvt, says dr adams repeated her leg us and she had no clots so since then she has been off of blood thinners. Currently she denies any CP, SOB, F/C/N/V/D.

## 2023-08-15 NOTE — ASSESSMENT & PLAN NOTE
"Patient's FSGs are controlled on current medication regimen.  Last A1c reviewed- No results found for: "LABA1C", "HGBA1C"  Most recent fingerstick glucose reviewed- No results for input(s): "POCTGLUCOSE" in the last 24 hours.  Current correctional scale  Low  Maintain anti-hyperglycemic dose as follows-   Antihyperglycemics (From admission, onward)    Start     Stop Route Frequency Ordered    08/15/23 1051  insulin aspart U-100 injection 0-5 Units         -- SubQ Before meals & nightly PRN 08/15/23 0951        Hold Oral hypoglycemics while patient is in the hospital.  "

## 2023-08-15 NOTE — PT/OT/SLP PROGRESS
Occupational Therapy   Treatment    Name: Jessica Cruz  MRN: [id number]  Admitting Diagnosis:  Osteoarthritis  1 Day Post-Op    Recommendations:     Discharge Recommendations: rehabilitation facility, nursing facility, skilled  Discharge Equipment Recommendations:  walker, rolling  Barriers to discharge:       Assessment:     Jessica Cruz is a 73 y.o. female with a medical diagnosis of Osteoarthritis.  She presents with alert, but still affected by her medications with mild confusion, mildly slurred speech and difficulty problem solving and difficulty processing information. Performance deficits affecting function are weakness, impaired endurance, impaired self care skills, impaired functional mobility, pain, orthopedic precautions, impaired cognition, decreased ROM.     Rehab Prognosis:  Good; patient would benefit from acute skilled OT services to address these deficits and reach maximum level of function.       Plan:     Patient to be seen 5 x/week to address the above listed problems via self-care/home management, therapeutic activities, therapeutic exercises  Plan of Care Expires: 08/21/23  Plan of Care Reviewed with: patient    Subjective     Chief Complaint: Right knee pain post op day 1 (R) TKR  Patient/Family Comments/goals: Pt wants to go to swingbed at D/C  Pain/Comfort:  Pain Rating 1: 4/10  Location - Side 1: Right  Location 1: knee  Pain Addressed 1: Pre-medicate for activity, Reposition, Cessation of Activity  Pain Rating Post-Intervention 1: 4/10    Objective:     Communicated with: LEELEE Muniz prior to session.  Patient found up in chair with peripheral IV upon OT entry to room.    General Precautions: Standard, fall    Orthopedic Precautions:RLE weight bearing as tolerated  Braces: Knee immobilizer  Respiratory Status: Room air     Occupational Performance:     Bed Mobility:    nt    Functional Mobility/Transfers:  Patient completed Sit <> Stand Transfer with minimum assistance  with   rolling walker and heavy cues   Patient completed Toilet Transfer Step Transfer technique with contact guard assistance and heavy cues with  rolling walker  Functional Mobility: PT required heavy cues and increased time and effort    Activities of Daily Living:  Bathing: minimum assistance after setup with cues  Upper Body Dressing: independence    Lower Body Dressing: moderate assistance    Toileting: minimum assistance for clothes management, SBA for hygiene      [facility] 6 Click ADL: 14    Treatment & Education:  Pt required heavy cues to complete all mobility and self-care today. Pt is concerned about not being able to speak clearly    Patient left up in chair with all lines intact, call button in reach, and spouse and LPN Elvis  present    GOALS:   Multidisciplinary Problems       Occupational Therapy Goals          Problem: Occupational Therapy    Goal Priority Disciplines Outcome Interventions   Occupational Therapy Goal     OT, PT/OT Ongoing, Progressing    Description: STG: (in 1 week)  1.Pt will perform bathing with setup and min(A)  2.Pt will perform UE dressing with setup  3.Pt will perform LE dressing with setup and min(A)  4.Pt will transfer bed/chair/bsc with CGA with RW  5.Pt will perform standing task x 3 min with CGA with RW  6.Tolerate 15 min of tx without fatigue.      LTG: (in 4 weeks)  Restore to max I with selfcare and mobility.                          Time Tracking:     OT Date of Treatment: 08/15/23  OT Start Time: 0939  OT Stop Time: 1032  OT Total Time (min): 53 min    Billable Minutes:Self Care/Home Management 53 min    OT/PHILLIP: OT          8/15/2023

## 2023-08-16 VITALS
RESPIRATION RATE: 18 BRPM | DIASTOLIC BLOOD PRESSURE: 58 MMHG | WEIGHT: 206 LBS | TEMPERATURE: 98 F | HEART RATE: 72 BPM | HEIGHT: 62 IN | SYSTOLIC BLOOD PRESSURE: 186 MMHG | BODY MASS INDEX: 37.91 KG/M2 | OXYGEN SATURATION: 97 %

## 2023-08-16 PROBLEM — M17.11 OSTEOARTHRITIS OF RIGHT KNEE: Status: ACTIVE | Noted: 2023-08-16

## 2023-08-16 LAB
GLUCOSE SERPL-MCNC: 162 MG/DL (ref 70–105)
GLUCOSE SERPL-MCNC: 226 MG/DL (ref 70–105)

## 2023-08-16 PROCEDURE — 97535 SELF CARE MNGMENT TRAINING: CPT

## 2023-08-16 PROCEDURE — 25000003 PHARM REV CODE 250: Performed by: ORTHOPAEDIC SURGERY

## 2023-08-16 PROCEDURE — 97112 NEUROMUSCULAR REEDUCATION: CPT | Mod: CQ

## 2023-08-16 PROCEDURE — 97116 GAIT TRAINING THERAPY: CPT | Mod: CQ

## 2023-08-16 PROCEDURE — 63600175 PHARM REV CODE 636 W HCPCS: Performed by: INTERNAL MEDICINE

## 2023-08-16 PROCEDURE — 82962 GLUCOSE BLOOD TEST: CPT | Mod: 91

## 2023-08-16 PROCEDURE — 97530 THERAPEUTIC ACTIVITIES: CPT | Mod: CQ

## 2023-08-16 PROCEDURE — 97110 THERAPEUTIC EXERCISES: CPT | Mod: CQ

## 2023-08-16 PROCEDURE — 63600175 PHARM REV CODE 636 W HCPCS

## 2023-08-16 RX ORDER — FAMOTIDINE 20 MG/1
20 TABLET, FILM COATED ORAL 2 TIMES DAILY
Status: DISCONTINUED | OUTPATIENT
Start: 2023-08-16 | End: 2023-08-16 | Stop reason: HOSPADM

## 2023-08-16 RX ORDER — ACETAMINOPHEN 10 MG/ML
1000 INJECTION, SOLUTION INTRAVENOUS ONCE
Status: DISCONTINUED | OUTPATIENT
Start: 2023-08-16 | End: 2023-08-16 | Stop reason: HOSPADM

## 2023-08-16 RX ORDER — SODIUM CHLORIDE 0.9 % (FLUSH) 0.9 %
3 SYRINGE (ML) INJECTION EVERY 6 HOURS PRN
Status: DISCONTINUED | OUTPATIENT
Start: 2023-08-16 | End: 2023-08-16 | Stop reason: HOSPADM

## 2023-08-16 RX ORDER — CELECOXIB 200 MG/1
200 CAPSULE ORAL 2 TIMES DAILY
Qty: 60 CAPSULE | Refills: 0 | Status: SHIPPED | OUTPATIENT
Start: 2023-08-16

## 2023-08-16 RX ORDER — ACETAMINOPHEN 500 MG
1000 TABLET ORAL EVERY 8 HOURS
Status: DISCONTINUED | OUTPATIENT
Start: 2023-08-16 | End: 2023-08-16 | Stop reason: HOSPADM

## 2023-08-16 RX ORDER — ZOLPIDEM TARTRATE 5 MG/1
5 TABLET ORAL NIGHTLY PRN
Status: DISCONTINUED | OUTPATIENT
Start: 2023-08-16 | End: 2023-08-16 | Stop reason: HOSPADM

## 2023-08-16 RX ORDER — NAPROXEN SODIUM 220 MG/1
325 TABLET, FILM COATED ORAL DAILY
Status: DISCONTINUED | OUTPATIENT
Start: 2023-08-17 | End: 2023-08-16 | Stop reason: HOSPADM

## 2023-08-16 RX ORDER — MUPIROCIN 20 MG/G
1 OINTMENT TOPICAL 2 TIMES DAILY
Status: DISCONTINUED | OUTPATIENT
Start: 2023-08-16 | End: 2023-08-16 | Stop reason: HOSPADM

## 2023-08-16 RX ORDER — OXYCODONE HYDROCHLORIDE 5 MG/1
10 TABLET ORAL EVERY 4 HOURS PRN
Status: DISCONTINUED | OUTPATIENT
Start: 2023-08-16 | End: 2023-08-16 | Stop reason: HOSPADM

## 2023-08-16 RX ORDER — BISACODYL 10 MG
10 SUPPOSITORY, RECTAL RECTAL DAILY PRN
Status: DISCONTINUED | OUTPATIENT
Start: 2023-08-16 | End: 2023-08-16 | Stop reason: HOSPADM

## 2023-08-16 RX ORDER — ONDANSETRON 4 MG/1
8 TABLET, ORALLY DISINTEGRATING ORAL EVERY 8 HOURS PRN
Status: DISCONTINUED | OUTPATIENT
Start: 2023-08-16 | End: 2023-08-16 | Stop reason: HOSPADM

## 2023-08-16 RX ORDER — POLYETHYLENE GLYCOL 3350 17 G/17G
17 POWDER, FOR SOLUTION ORAL DAILY
Status: DISCONTINUED | OUTPATIENT
Start: 2023-08-16 | End: 2023-08-16 | Stop reason: HOSPADM

## 2023-08-16 RX ORDER — OXYCODONE AND ACETAMINOPHEN 10; 325 MG/1; MG/1
1 TABLET ORAL EVERY 6 HOURS PRN
Qty: 35 TABLET | Refills: 0 | Status: SHIPPED | OUTPATIENT
Start: 2023-08-16

## 2023-08-16 RX ORDER — AMOXICILLIN 250 MG
1 CAPSULE ORAL 2 TIMES DAILY
Qty: 60 TABLET | Refills: 0 | Status: SHIPPED | OUTPATIENT
Start: 2023-08-16

## 2023-08-16 RX ORDER — ONDANSETRON 4 MG/1
8 TABLET, ORALLY DISINTEGRATING ORAL EVERY 8 HOURS PRN
Qty: 30 TABLET | Refills: 0 | Status: SHIPPED | OUTPATIENT
Start: 2023-08-16

## 2023-08-16 RX ORDER — OXYCODONE HYDROCHLORIDE 5 MG/1
5 TABLET ORAL EVERY 4 HOURS PRN
Status: DISCONTINUED | OUTPATIENT
Start: 2023-08-16 | End: 2023-08-16 | Stop reason: HOSPADM

## 2023-08-16 RX ORDER — LOPERAMIDE HYDROCHLORIDE 2 MG/1
4 CAPSULE ORAL ONCE
Status: DISCONTINUED | OUTPATIENT
Start: 2023-08-16 | End: 2023-08-16 | Stop reason: HOSPADM

## 2023-08-16 RX ORDER — PREGABALIN 75 MG/1
75 CAPSULE ORAL 2 TIMES DAILY
Status: DISCONTINUED | OUTPATIENT
Start: 2023-08-16 | End: 2023-08-16 | Stop reason: HOSPADM

## 2023-08-16 RX ORDER — CELECOXIB 100 MG/1
200 CAPSULE ORAL 2 TIMES DAILY
Status: DISCONTINUED | OUTPATIENT
Start: 2023-08-16 | End: 2023-08-16 | Stop reason: HOSPADM

## 2023-08-16 RX ORDER — DOCUSATE SODIUM 100 MG/1
100 CAPSULE, LIQUID FILLED ORAL EVERY 12 HOURS
Status: DISCONTINUED | OUTPATIENT
Start: 2023-08-16 | End: 2023-08-16 | Stop reason: HOSPADM

## 2023-08-16 RX ADMIN — DOCUSATE SODIUM 100 MG: 100 CAPSULE, LIQUID FILLED ORAL at 10:08

## 2023-08-16 RX ADMIN — POLYETHYLENE GLYCOL 3350 17 G: 17 POWDER, FOR SOLUTION ORAL at 10:08

## 2023-08-16 RX ADMIN — OXYCODONE HYDROCHLORIDE 5 MG: 5 TABLET ORAL at 10:08

## 2023-08-16 RX ADMIN — MUPIROCIN 1 G: 20 OINTMENT TOPICAL at 10:08

## 2023-08-16 RX ADMIN — DULOXETINE HYDROCHLORIDE 60 MG: 30 CAPSULE, DELAYED RELEASE ORAL at 10:08

## 2023-08-16 RX ADMIN — OXYBUTYNIN CHLORIDE 5 MG: 5 TABLET ORAL at 10:08

## 2023-08-16 RX ADMIN — PREGABALIN 75 MG: 75 CAPSULE ORAL at 10:08

## 2023-08-16 RX ADMIN — APIXABAN 5 MG: 5 TABLET, FILM COATED ORAL at 10:08

## 2023-08-16 RX ADMIN — INSULIN ASPART 2 UNITS: 100 INJECTION, SOLUTION INTRAVENOUS; SUBCUTANEOUS at 06:08

## 2023-08-16 RX ADMIN — CELECOXIB 200 MG: 100 CAPSULE ORAL at 10:08

## 2023-08-16 RX ADMIN — FAMOTIDINE 20 MG: 20 TABLET, FILM COATED ORAL at 10:08

## 2023-08-16 NOTE — ANESTHESIA POSTPROCEDURE EVALUATION
Anesthesia Post Evaluation    Patient: Jessica Cruz    Procedure(s) Performed: Procedure(s) (LRB):  ARTHROPLASTY, KNEE, TOTAL, USING COMPUTER-ASSISTED NAVIGATION (Right)    Final Anesthesia Type: general      Patient location during evaluation: PACU  Patient participation: Yes- Able to Participate  Level of consciousness: awake and alert  Post-procedure vital signs: reviewed and stable  Pain management: adequate  Airway patency: patent  ALEC mitigation strategies: Multimodal analgesia and Use of major conduction anesthesia (spinal/epidural) or peripheral nerve block  PONV status at discharge: No PONV  Anesthetic complications: no      Cardiovascular status: blood pressure returned to baseline  Respiratory status: unassisted  Hydration status: euvolemic  Follow-up not needed.          Vitals Value Taken Time   /58 08/16/23 1100   Temp 36.8 °C (98.2 °F) 08/16/23 1100   Pulse 72 08/16/23 1100   Resp 18 08/16/23 1100   SpO2 97 % 08/16/23 1100         Event Time   Out of Recovery 16:50:00         Pain/Bryan Score: Pain Rating Prior to Med Admin: 9 (8/16/2023 10:03 AM)  Pain Rating Post Med Admin: 0 (8/16/2023  7:45 AM)  Bryan Score: 10 (8/16/2023  7:45 AM)

## 2023-08-16 NOTE — PT/OT/SLP PROGRESS
Occupational Therapy   Treatment    Name: Jessica Cruz  MRN: [id number]  Admitting Diagnosis:  Osteoarthritis of right knee  2 Days Post-Op    Recommendations:     Discharge Recommendations: rehabilitation facility, nursing facility, skilled  Discharge Equipment Recommendations:  walker, rolling  Barriers to discharge:       Assessment:     Jessica Cruz is a 73 y.o. female with a medical diagnosis of Osteoarthritis of right knee.  She presents with alert and agreeable to treatment. Pt is about to ambulate with PT on OT arrival. OT stayed to assist as no tech is available.. Performance deficits affecting function are weakness, impaired endurance, impaired self care skills, impaired functional mobility, gait instability, impaired cognition, decreased lower extremity function, pain, decreased safety awareness, decreased ROM, orthopedic precautions.     Rehab Prognosis:  Good; patient would benefit from acute skilled OT services to address these deficits and reach maximum level of function.       Plan:     Patient to be seen 5 x/week to address the above listed problems via self-care/home management, therapeutic activities, therapeutic exercises  Plan of Care Expires: 08/21/23  Plan of Care Reviewed with: patient    Subjective     Chief Complaint: (R) knee pain post op (R) TKR  Patient/Family Comments/goals: Pt and family want for pt to go to swingbed  Pain/Comfort:  Pain Rating 1: 6/10  Location - Side 1: Right  Location 1: knee  Pain Addressed 1: Pre-medicate for activity, Cessation of Activity  Pain Rating Post-Intervention 1: 4/10    Objective:     Communicated with: LEELEE Muniz prior to session.  Patient found up in chair with peripheral IV upon OT entry to room.    General Precautions: Standard, fall    Orthopedic Precautions:RLE weight bearing as tolerated  Braces: Knee immobilizer  Respiratory Status: Room air     Occupational Performance:     Bed Mobility:    NT    Functional  Mobility/Transfers:  Patient completed Sit <> Stand Transfer with minimum assistance  with  rolling walker   Patient completed Bed <> Chair Transfer using Step Transfer technique with contact guard assistance, minimum assistance, and heavy cues for sequencing with rolling walker  Patient completed Toilet Transfer Step Transfer technique with minimum assistance with  rolling walker and heavy verbal cues  Functional Mobility: Pt required heavy verbal cues for sequencing and safety    Activities of Daily Living:  Grooming: independence with access to supplies  Upper Body Dressing: stand by assistance for pull over dress  Toileting: moderate assistance for clothes management, SBA for hygiene      [facility] 6 Click ADL: 19    Treatment & Education:  Pt is expecting to transfer to [facility] today. Pt still needs much assistance with functional mobility and lower body ADLs    Patient left  with call button in reachup in chair    GOALS:   Multidisciplinary Problems       Occupational Therapy Goals          Problem: Occupational Therapy    Goal Priority Disciplines Outcome Interventions   Occupational Therapy Goal     OT, PT/OT Ongoing, Progressing    Description: STG: (in 1 week)  1.Pt will perform bathing with setup and min(A)  2.Pt will perform UE dressing with setup  3.Pt will perform LE dressing with setup and min(A)  4.Pt will transfer bed/chair/bsc with CGA with RW  5.Pt will perform standing task x 3 min with CGA with RW  6.Tolerate 15 min of tx without fatigue.      LTG: (in 4 weeks)  Restore to max I with selfcare and mobility.                          Time Tracking:     OT Date of Treatment: 08/16/23  OT Start Time: 1028  OT Stop Time: 1059  OT Total Time (min): 31 min    Billable Minutes:Self Care/Home Management 25 min    OT/PHILLIP: OT          8/16/2023

## 2023-08-16 NOTE — PLAN OF CARE
Pt has received insurance precertification for SWB placement at [facility]. Pt can dc on today to [facility], if stable for dc. SW to notify care team at this time.

## 2023-08-16 NOTE — PLAN OF CARE
[facility] - [facility] Telemetry  Discharge Final Note    Primary Care Provider: No, Primary Doctor    Expected Discharge Date: 8/16/2023    Final Discharge Note (most recent)       Final Note - 08/16/23 1436          Final Note    Assessment Type Final Discharge Note     Anticipated Discharge Disposition Swing Bed     What phone number can be called within the next 1-3 days to see how you are doing after discharge? [phone]        Post-Acute Status    Post-Acute Authorization Placement     Post-Acute Placement Status Set-up Complete/Auth obtained     Patient choice form signed by patient/caregiver List with quality metrics by geographic area provided;List from CMS Compare;List from System Post-Acute Care     Discharge Delays None known at this time                     Contact Info       Inga Garrett FNP   Specialty: Family Medicine, Emergency Medicine, Orthopedic Surgery    [address] [place] [id number]   Phone: [phone]       Next Steps: Follow up on 8/25/2023    Instructions: 10:20 am          Pt to dc to [facility]ing[facility] on this day. Packet taken to nurse on this morning. Awaiting dc summary. 0 dc needs noted.

## 2023-08-16 NOTE — PT/OT/SLP PROGRESS
Physical Therapy Treatment    Patient Name:  Jesisca Cruz   MRN:  [id number]    Recommendations:     Discharge Recommendations: rehabilitation facility, nursing facility, skilled  Discharge Equipment Recommendations: walker, rolling  Barriers to discharge: Inaccessible home and None    Assessment:     Jessica Cruz is a 73 y.o. female admitted with a medical diagnosis of Osteoarthritis of right knee.  She presents with the following impairments/functional limitations: weakness, impaired endurance, impaired self care skills, impaired functional mobility, pain, orthopedic precautions, impaired cognition, decreased ROM. Pt. Displayed improved mobility with consecutive attempts of transfers and gait this visit, requires frequent safety/ task cues.     Rehab Prognosis: Good; patient would benefit from acute skilled PT services to address these deficits and reach maximum level of function.    Recent Surgery: Procedure(s) (LRB):  ARTHROPLASTY, KNEE, TOTAL, USING COMPUTER-ASSISTED NAVIGATION (Right) 2 Days Post-Op    Plan:     During this hospitalization, patient to be seen BID to address the identified rehab impairments via gait training, therapeutic exercises, therapeutic activities and progress toward the following goals:    Plan of Care Expires:  09/14/23    Subjective     Chief Complaint: pain  Patient/Family Comments/goals: Pt. States she hopes to go to [facility].   Pain/Comfort:  Pain Rating 1: 8/10  Location - Side 1: Right  Location 1: knee  Pain Addressed 1: Nurse notified, Pre-medicate for activity      Objective:     Communicated with Elvis Malik LPN and Zeina Guzman, CECI for POC prior to session.  Patient found HOB elevated with peripheral IV upon PT entry to room.     General Precautions: Standard, fall  Orthopedic Precautions: RLE weight bearing as tolerated  Braces: Knee immobilizer  Respiratory Status: Room air     Functional Mobility:  Bed Mobility:     Rolling Left:  minimum  assistance  Scooting: minimum assistance  Supine to Sit: minimum assistance  Transfers:     Sit to Stand:  minimum assistance with rolling walker  Toilet Transfer: minimum assistance with  rolling walker  using  Step Transfer  Gait: Pt. Amb. X 12 and x 6 feet with RW with Min A at gait belt for wt. Shifts, assist to progress R LE with first several steps and VC for direction/ placement of walker with turning.       AM-PAC 6 CLICK MOBILITY  Turning over in bed (including adjusting bedclothes, sheets and blankets)?: 3  Sitting down on and standing up from a chair with arms (e.g., wheelchair, bedside commode, etc.): 3  Moving from lying on back to sitting on the side of the bed?: 3  Moving to and from a bed to a chair (including a wheelchair)?: 3  Need to walk in hospital room?: 3  Climbing 3-5 steps with a railing?: 3  Basic Mobility Total Score: 18       Treatment & Education:  Pt. Participated in mobility per above and Supine LE PREs: AP, QS, Hip ABD/. ADD and Heel slides 2 x 10 reps each on Right with frequent rests required to complete.     Patient left up in chair with call button in reach and spouse present..    GOALS:   Multidisciplinary Problems       Physical Therapy Goals          Problem: Physical Therapy    Goal Priority Disciplines Outcome Goal Variances Interventions   Physical Therapy Goal     PT, PT/OT Ongoing, Progressing     Description: Short Term Goals to be met by: 2023    Patient will increase functional independence with mobility by performin. Supine to sit with Modified [place]  2. Sit to stand transfer with Modified [place]  3. Bed to chair transfer with Modified [place] using Rolling Walker, WBAT right LE  4. Gait  x 100 feet with Modified [place] using Rolling Walker, WBAT right LE.   5. Lower extremity exercise program x30 reps per handout, with assistance as needed  6. Knee ROM 0-90    Long Term Goals to be met by: 10/14/2023    Pt will regain full  independent functional mobility to return to prior activities of daily living.                        Time Tracking:     PT Received On: 08/16/23  PT Start Time: 0941     PT Stop Time: 1048  PT Total Time (min): 67 min     Billable Minutes: Gait Training 20, Therapeutic Activity 13, Therapeutic Exercise 19, and Neuromuscular Re-education 15    Treatment Type: Treatment  PT/PTA: PTA     Number of PTA visits since last PT visit: 1 08/16/2023

## 2023-08-16 NOTE — PLAN OF CARE
Problem: Adult Inpatient Plan of Care  Goal: Plan of Care Review  Outcome: Met  Flowsheets (Taken 8/16/2023 1224)  Plan of Care Reviewed With:   patient   spouse  Goal: Patient-Specific Goal (Individualized)  Outcome: Met  Flowsheets (Taken 8/16/2023 1224)  Anxieties, Fears or Concerns: pain  Individualized Care Needs: pain  Goal: Absence of Hospital-Acquired Illness or Injury  Outcome: Met  Intervention: Identify and Manage Fall Risk  Flowsheets (Taken 8/16/2023 1224)  Safety Promotion/Fall Prevention: assistive device/personal item within reach  Intervention: Prevent Skin Injury  Flowsheets (Taken 8/16/2023 1224)  Body Position: position changed independently  Skin Protection: incontinence pads utilized  Intervention: Prevent and Manage VTE (Venous Thromboembolism) Risk  Flowsheets (Taken 8/16/2023 1224)  Activity Management: Ambulated -L4  VTE Prevention/Management: remove, assess skin, and reapply sequential compression device  Goal: Optimal Comfort and Wellbeing  Outcome: Met  Intervention: Monitor Pain and Promote Comfort  Flowsheets (Taken 8/16/2023 1224)  Pain Management Interventions: around-the-clock dosing utilized  Goal: Readiness for Transition of Care  Outcome: Met     Problem: Infection  Goal: Absence of Infection Signs and Symptoms  Outcome: Met     Problem: Diabetes Comorbidity  Goal: Blood Glucose Level Within Targeted Range  Outcome: Met

## 2023-08-23 NOTE — DISCHARGE SUMMARY
[facility]  [facility]charge Note  Short Stay    Procedure(s) (LRB):  ARTHROPLASTY, KNEE, TOTAL, USING COMPUTER-ASSISTED NAVIGATION (Right)      OUTCOME: Patient tolerated treatment/procedure well without complication and is now ready for discharge.  Jessica Cruz was admitted following the aforementioned surgical procedure.  she received perioperative antibiotics over 23 hours following the surgery and participated in post operative physical therapy.  she was made familiar with the post-operative rehabilitation process and the need for DVT prophylaxis was discussed prior to discharged.  Jessica Cruz was discharged safely having suffered no untoward events.     DISPOSITION: Home or Self Care    FINAL DIAGNOSIS:  Osteoarthritis of right knee    FOLLOWUP: In clinic    DISCHARGE INSTRUCTIONS:    Discharge Procedure Orders   Diet general     Keep surgical extremity elevated     No driving, operating heavy equipment or signing legal documents while taking pain medication     Remove dressing in 48 hours   Order Comments: If patient has a ROSA negative pressure wound dressing, this can be kept in place for 7 days and then discontinued by Home health.  Standard dressings changed at 48 hours     Call MD for:  temperature >100.4     Call MD for:  persistent nausea and vomiting     Call MD for:  severe uncontrolled pain     Call MD for:  difficulty breathing, headache or visual disturbances     Call MD for:  redness, tenderness, or signs of infection (pain, swelling, redness, odor or green/yellow discharge around incision site)     Call MD for:  hives     Call MD for:  persistent dizziness or light-headedness     Call MD for:  extreme fatigue     Weight bearing as tolerated         Clinical Reference Documents Added to Patient Instructions         Document    HOW TO USE A WALKER (ENGLISH)    HOW TO USE AN INCENTIVE SPIROMETER (ENGLISH)    TOTAL KNEE REPLACEMENT DISCHARGE INSTRUCTIONS   (ENGLISH)            TIME SPENT ON DISCHARGE: 9 minutes

## 2023-08-28 ENCOUNTER — OFFICE VISIT (OUTPATIENT)
Dept: ORTHOPEDICS | Facility: CLINIC | Age: 73
End: 2023-08-28
Payer: MEDICARE

## 2023-08-28 DIAGNOSIS — Z96.651 S/P TOTAL KNEE ARTHROPLASTY, RIGHT: Primary | ICD-10-CM

## 2023-08-28 PROCEDURE — 99024 PR POST-OP FOLLOW-UP VISIT: ICD-10-PCS | Mod: ,,, | Performed by: NURSE PRACTITIONER

## 2023-08-28 PROCEDURE — 99024 POSTOP FOLLOW-UP VISIT: CPT | Mod: ,,, | Performed by: NURSE PRACTITIONER

## 2023-08-28 PROCEDURE — 99213 OFFICE O/P EST LOW 20 MIN: CPT | Mod: PBBFAC | Performed by: NURSE PRACTITIONER

## 2023-08-28 PROCEDURE — 1159F MED LIST DOCD IN RCRD: CPT | Mod: CPTII,,, | Performed by: NURSE PRACTITIONER

## 2023-08-28 PROCEDURE — 1159F PR MEDICATION LIST DOCUMENTED IN MEDICAL RECORD: ICD-10-PCS | Mod: CPTII,,, | Performed by: NURSE PRACTITIONER

## 2023-08-28 NOTE — PROGRESS NOTES
Post-Operative Total Knee        No surgery found No surgery found      Pt is here today for First post-operative followup of her No surgery found.    Patient is 2 weeks postop right total knee arthroplasty, doing well.  Incision looks good today.  Staples removed and Steri-Strips applied.  she is doing well.  She has been in swing bed at [facility] and wish to begin outpatient therapy there at this time.     We have reviewed her findings and discussed plan of care and future treatment options, including the physical therapy plan.        Physical Exam:     The affected knee was inspected today.   The wound is healing well with no signs of erythema or warmth.  There is no drainage.  No clinical signs or symptoms of infection are present.   ROM: 2-95  -Amanda's sign.  2+ pulses are present.         Assessment and Plan  1. S/P total knee arthroplasty, right  - Ambulatory referral/consult to Physical/Occupational Therapy; Future            We will continue post-operative physical therapy until the patient feels that they are independent with a home rehab program.  Return to work status/ return to activities status was discussed today in detail. All questions were answered.    The use of stockings and DVT prophylaxis was discussed.  Will follow up in additional 4 weeks with radiographs at that time.     There are no Patient Instructions on file for this visit.

## 2023-09-20 DIAGNOSIS — Z96.651 S/P TOTAL KNEE ARTHROPLASTY, RIGHT: Primary | ICD-10-CM

## 2023-09-26 ENCOUNTER — OFFICE VISIT (OUTPATIENT)
Dept: ORTHOPEDICS | Facility: CLINIC | Age: 73
End: 2023-09-26
Payer: COMMERCIAL

## 2023-09-26 ENCOUNTER — HOSPITAL ENCOUNTER (OUTPATIENT)
Dept: RADIOLOGY | Facility: HOSPITAL | Age: 73
Discharge: HOME | End: 2023-09-26
Attending: ORTHOPAEDIC SURGERY
Payer: COMMERCIAL

## 2023-09-26 DIAGNOSIS — Z96.651 S/P TOTAL KNEE ARTHROPLASTY, RIGHT: Primary | ICD-10-CM

## 2023-09-26 DIAGNOSIS — Z96.651 S/P TOTAL KNEE ARTHROPLASTY, RIGHT: ICD-10-CM

## 2023-09-26 PROCEDURE — 99024 PR POST-OP FOLLOW-UP VISIT: ICD-10-PCS | Mod: ,,, | Performed by: ORTHOPAEDIC SURGERY

## 2023-09-26 PROCEDURE — 99212 OFFICE O/P EST SF 10 MIN: CPT | Mod: PBBFAC | Performed by: ORTHOPAEDIC SURGERY

## 2023-09-26 PROCEDURE — 73562 X-RAY EXAM OF KNEE 3: CPT | Mod: 26,RT,, | Performed by: ORTHOPAEDIC SURGERY

## 2023-09-26 PROCEDURE — 99024 POSTOP FOLLOW-UP VISIT: CPT | Mod: ,,, | Performed by: ORTHOPAEDIC SURGERY

## 2023-09-26 PROCEDURE — 73562 X-RAY EXAM OF KNEE 3: CPT | Mod: TC,RT

## 2023-09-26 PROCEDURE — 73562 XR KNEE 3 VIEW RIGHT: ICD-10-PCS | Mod: 26,RT,, | Performed by: ORTHOPAEDIC SURGERY

## 2023-09-26 NOTE — PROGRESS NOTES
HISTORY OF PRESENT ILLNESS:       Arthroplasty, Knee, Total, Using Computer-assisted Navigation - Right 8/14/2023      Pt is here today for Second post-operative followup of her knee arthroscopy.  she is doing well.  We have reviewed her findings and discussed plan of care and future treatment options, including the physical therapy plan.      Pt is doing good on today. She is 6 weeks post  surgery, states she is having very little pain.                                                                               PHYSICAL EXAMINATION:     Incision sites healed well  No evidence of any erythema, infection or induration  Range of motion 0-120 degrees  Minimal effusion  2+ DP pulse  No swelling, no calf tenderness  - Amanda's sign  Negative medial joint line tendernes  Moderate quad atrophy    X-Ray Knee 3 View Right    Result Date: 9/26/2023  See Procedure Notes for results. IMPRESSION: Please see Ortho procedure notes for report.  This procedure was auto-finalized by: Virtual Radiologist   Three views right knee were obtained today demonstrating stable appearing total knee prosthesis in excellent alignment with no adverse interval change                                                                               ASSESSMENT:                                                                                                                                               1. Status post above, doing well.                                                                                                                               PLAN:                                                                                                                                                     1. Continue with PT  2. Emphasized quad function.  3. I have discussed return to activity in detail.  4. she will see us back in 6 weeks.                                      5. All questions were answered and she should contact us if she  has  any questions or concerns in the interim.              There are no Patient Instructions on file for this visit.

## 2023-11-03 DIAGNOSIS — Z96.651 S/P TOTAL KNEE ARTHROPLASTY, RIGHT: Primary | ICD-10-CM

## 2023-11-07 ENCOUNTER — HOSPITAL ENCOUNTER (OUTPATIENT)
Dept: RADIOLOGY | Facility: HOSPITAL | Age: 73
Discharge: HOME | End: 2023-11-07
Attending: ORTHOPAEDIC SURGERY
Payer: COMMERCIAL

## 2023-11-07 ENCOUNTER — OFFICE VISIT (OUTPATIENT)
Dept: ORTHOPEDICS | Facility: CLINIC | Age: 73
End: 2023-11-07
Payer: COMMERCIAL

## 2023-11-07 DIAGNOSIS — Z96.651 S/P TOTAL KNEE ARTHROPLASTY, RIGHT: Primary | ICD-10-CM

## 2023-11-07 DIAGNOSIS — Z96.651 S/P TOTAL KNEE ARTHROPLASTY, RIGHT: ICD-10-CM

## 2023-11-07 PROCEDURE — 99212 OFFICE O/P EST SF 10 MIN: CPT | Mod: PBBFAC | Performed by: ORTHOPAEDIC SURGERY

## 2023-11-07 PROCEDURE — 73562 X-RAY EXAM OF KNEE 3: CPT | Mod: 26,RT,, | Performed by: ORTHOPAEDIC SURGERY

## 2023-11-07 PROCEDURE — 73562 XR KNEE 3 VIEW RIGHT: ICD-10-PCS | Mod: 26,RT,, | Performed by: ORTHOPAEDIC SURGERY

## 2023-11-07 PROCEDURE — 73562 X-RAY EXAM OF KNEE 3: CPT | Mod: TC,RT

## 2023-11-07 PROCEDURE — 99024 PR POST-OP FOLLOW-UP VISIT: ICD-10-PCS | Mod: ,,, | Performed by: ORTHOPAEDIC SURGERY

## 2023-11-07 PROCEDURE — 99024 POSTOP FOLLOW-UP VISIT: CPT | Mod: ,,, | Performed by: ORTHOPAEDIC SURGERY

## 2023-11-07 NOTE — PROGRESS NOTES
CC:  Knee pain    73 y.o. Female returns to clinic for a follow up visit regarding knee pain.      Pt is 12 weeks post surgery and doing good, no complaints of pain.       Past Medical History:   Diagnosis Date    Diabetes mellitus, type 2      Past Surgical History:   Procedure Laterality Date    ARTHROPLASTY, KNEE, TOTAL, USING COMPUTER-ASSISTED NAVIGATION Right 2023    Procedure: ARTHROPLASTY, KNEE, TOTAL, USING COMPUTER-ASSISTED NAVIGATION;  Surgeon: Walt Duke MD;  Location: [facility] OR;  Service: Orthopedics;  Laterality: Right;    CEREBRAL ANEURYSM REPAIR       SECTION      deaf Right     since childhood    JOINT REPLACEMENT      LAPAROSCOPIC CHOLECYSTECTOMY      SHOULDER ARTHROSCOPY           PHYSICAL EXAMINATION:  There were no vitals taken for this visit.  Ortho/SPM Exam  Range of motion 0/0/120°; well-healed surgical incision    IMAGING:  X-Ray Knee 3 View Right    Result Date: 2023  See Procedure Notes for results. IMPRESSION: Please see Ortho procedure notes for report.  This procedure was auto-finalized by: Virtual Radiologist   Three views of the right knee demonstrate stable appearing total knee prosthesis in excellent alignment    ASSESSMENT:      ICD-10-CM ICD-9-CM   1. S/P total knee arthroplasty, right  Z96.651 V43.65       PLAN:     -Findings and treatment options were discussed with the patient  -All questions answered  Natural history and expected course discussed. Questions answered.  Educational materials distributed.  Rest, ice, compression, and elevation (RICE) therapy.  Doing excellent.  At this point she is completed physical therapy and has done very very well.  I am going to see her back in 9 months' time with repeat x-rays    There are no Patient Instructions on file for this visit.      No orders of the defined types were placed in this encounter.        Procedures

## 2024-08-07 DIAGNOSIS — Z96.651 S/P TOTAL KNEE ARTHROPLASTY, RIGHT: Primary | ICD-10-CM

## (undated) DEVICE — SYR 30CC LUER LOCK

## (undated) DEVICE — SPACESUIT TOGA T5 ZIPPER PEEL

## (undated) DEVICE — GLOVE BIOGEL SKINSENSE PI 6.5

## (undated) DEVICE — GLOVE 6.5 PROTEXIS PI BLUE

## (undated) DEVICE — COMPR KNEE STRAIGHT STAY 20IN

## (undated) DEVICE — PAD CAST SPECIALIST STRL 3

## (undated) DEVICE — BLADE PERFORMANCE SAG 21X90MM

## (undated) DEVICE — KIT TOTAL KNEE RUSH

## (undated) DEVICE — SET CYSTO IRR DRP CHMBR 84IN

## (undated) DEVICE — NDL QUINCKE SPINAL 18G 3.5IN

## (undated) DEVICE — DRESSING GAUZE XEROFORM 5X9

## (undated) DEVICE — GAUZE SPONGE 4X4 12PLY

## (undated) DEVICE — GLOVE 7.0 PROTEXIS PI BLUE

## (undated) DEVICE — SUT 2-0 VICRYL / CT-1

## (undated) DEVICE — DRAPE INCISE IOBAN 2 23X23IN

## (undated) DEVICE — SOL NACL IRR 3000ML

## (undated) DEVICE — GLOVE 7.5 PROTEXIS PI BLUE

## (undated) DEVICE — TOURNIQUET SB QC SP 34X4IN

## (undated) DEVICE — SKIN STAPLER PMR35

## (undated) DEVICE — KIT IRR SUCTION HND PIECE

## (undated) DEVICE — BLADE RECIP DOUBLE SIDED

## (undated) DEVICE — SOL NACL IRR 1000ML BTL

## (undated) DEVICE — ATTUNE PINNING SYSTEM
Type: IMPLANTABLE DEVICE | Site: KNEE | Status: NON-FUNCTIONAL
Brand: ATTUNE
Removed: 2023-08-14

## (undated) DEVICE — GLOVE BIOGEL SKINSENSE PI 7.0

## (undated) DEVICE — SPHERE MARKER REFLECTIVE DISP

## (undated) DEVICE — GLOVE BIOGEL SKINSENSE PI 7.5

## (undated) DEVICE — SUT VICRYL CTD 1 27IN CP

## (undated) DEVICE — SIGMA LCS HIGH PERFORMANCE INSTRUMENTS STERILE THREADED PINS
Type: IMPLANTABLE DEVICE | Site: KNEE | Status: NON-FUNCTIONAL
Brand: SIGMA LCS HIGH PERFORMANCE
Removed: 2023-08-14

## (undated) DEVICE — APPLICATOR CHLORAPREP ORN 26ML

## (undated) DEVICE — TOWER MIX CEMENT BONE SMARTMIX